# Patient Record
Sex: MALE | Race: WHITE | NOT HISPANIC OR LATINO | Employment: OTHER | URBAN - METROPOLITAN AREA
[De-identification: names, ages, dates, MRNs, and addresses within clinical notes are randomized per-mention and may not be internally consistent; named-entity substitution may affect disease eponyms.]

---

## 2017-10-14 ENCOUNTER — HOSPITAL ENCOUNTER (EMERGENCY)
Facility: HOSPITAL | Age: 82
Discharge: HOME | End: 2017-10-14
Attending: EMERGENCY MEDICINE | Admitting: EMERGENCY MEDICINE
Payer: MEDICARE

## 2017-10-14 VITALS
RESPIRATION RATE: 18 BRPM | HEART RATE: 78 BPM | BODY MASS INDEX: 24.44 KG/M2 | HEIGHT: 69 IN | WEIGHT: 165 LBS | DIASTOLIC BLOOD PRESSURE: 73 MMHG | SYSTOLIC BLOOD PRESSURE: 130 MMHG | TEMPERATURE: 98.3 F | OXYGEN SATURATION: 98 %

## 2017-10-14 DIAGNOSIS — F03.90 DEMENTIA (HCC): ICD-10-CM

## 2017-10-14 DIAGNOSIS — R46.89 COMBATIVE BEHAVIOR: Primary | ICD-10-CM

## 2017-10-14 DIAGNOSIS — Z86.59 HISTORY OF PSYCHIATRIC DISORDER: ICD-10-CM

## 2017-10-14 PROCEDURE — 99285 EMERGENCY DEPT VISIT HI MDM: CPT

## 2017-10-14 RX ORDER — DOXYCYCLINE HYCLATE 50 MG/1
324 CAPSULE, GELATIN COATED ORAL
COMMUNITY
End: 2019-01-02

## 2017-10-14 RX ORDER — TAMSULOSIN HYDROCHLORIDE 0.4 MG/1
0.4 CAPSULE ORAL
COMMUNITY

## 2017-10-14 RX ORDER — ACETAMINOPHEN 325 MG/1
650 TABLET ORAL EVERY 6 HOURS PRN
COMMUNITY

## 2017-10-14 RX ORDER — FINASTERIDE 5 MG/1
5 TABLET, FILM COATED ORAL DAILY
COMMUNITY

## 2017-10-14 NOTE — ED PROVIDER NOTES
History  Chief Complaint   Patient presents with    Aggressive Behavior     Per [facility] care patient was verbally aggessive with staff  Patient given ativan 0 25 mg PTA  Patient offers no verbal c/c at this time  79 y/o M presents with aggression  Hx of BPD, dementia, Depression  He made verbal threats to female nurses, has been going on daily  She states on the phone: " we know he will be sent back - but we need him away for awhile "  He has been violent to the nurses  Patient thinks he lives with his parents  Nurses gave him ativan which normally calms him down - but it was not today  This is his baseline, always gets worse at 3PM   He takes for psych the following meds: ativan, celexa, seroquel, neurontin  On ROS he states I am fine  No falls or accidents we are aware of  Normal exam   Normal vitals              Prior to Admission Medications   Prescriptions Last Dose Informant Patient Reported? Taking? Linagliptin (TRADJENTA) 5 MG TABS   Yes Yes   Sig: Take 5 mg by mouth daily   acetaminophen (TYLENOL) 325 mg tablet   Yes Yes   Sig: Take 650 mg by mouth every 6 (six) hours as needed for mild pain   ferrous gluconate (FERGON) 324 mg tablet   Yes Yes   Sig: Take 324 mg by mouth daily with breakfast   finasteride (PROSCAR) 5 mg tablet   Yes Yes   Sig: Take 5 mg by mouth daily   tamsulosin (FLOMAX) 0 4 mg   Yes Yes   Sig: Take 0 4 mg by mouth daily with dinner      Facility-Administered Medications: None       Past Medical History:   Diagnosis Date    Diabetes mellitus ([facility] [address] )     Hyperlipidemia     Psychiatric disorder        History reviewed  No pertinent surgical history  History reviewed  No pertinent family history  I have reviewed and agree with the history as documented      Social History   Substance Use Topics    Smoking status: Never Smoker    Smokeless tobacco: Never Used    Alcohol use No        Review of Systems   Unable to perform ROS: Dementia ([place] but denies all complaints) Constitutional: Negative for activity change, appetite change, chills and fever  HENT: Negative for congestion, rhinorrhea and sore throat  Eyes: Negative for photophobia and pain  Respiratory: Negative for cough, chest tightness and wheezing  Cardiovascular: Negative for chest pain, palpitations and leg swelling  Gastrointestinal: Negative for abdominal distention, abdominal pain, constipation, diarrhea and nausea  Genitourinary: Negative for dysuria, flank pain, frequency and hematuria  Musculoskeletal: Negative for arthralgias, back pain, myalgias, neck pain and neck stiffness  Skin: Negative for color change and rash  Neurological: Negative for seizures, speech difficulty, weakness, light-headedness and headaches  Hematological: Negative for adenopathy  Psychiatric/Behavioral: Negative for agitation, confusion, hallucinations and suicidal ideas  Physical Exam  ED Triage Vitals [10/14/17 1655]   Temperature Pulse Respirations Blood Pressure SpO2   98 3 °F (36 8 °C) 78 18 130/73 98 %      Temp Source Heart Rate Source Patient Position - Orthostatic VS BP Location FiO2 (%)   Oral Monitor Sitting Right arm --      Pain Score       No Pain           Physical Exam   Constitutional: He is oriented to person, place, and time  He appears well-developed and well-nourished  No distress  HENT:   Head: Normocephalic and atraumatic  Right Ear: External ear normal    Left Ear: External ear normal    Mouth/Throat: Oropharynx is clear and moist  No oropharyngeal exudate  Eyes: Conjunctivae and EOM are normal  Pupils are equal, round, and reactive to light  Right eye exhibits no discharge  Left eye exhibits no discharge  Neck: Normal range of motion  Neck supple  No JVD present  No tracheal deviation present  Cardiovascular: Normal rate and normal heart sounds  No murmur heard  Pulmonary/Chest: Effort normal and breath sounds normal  No respiratory distress  He has no wheezes   He has no rales  Abdominal: Soft  Bowel sounds are normal  He exhibits no distension  There is no tenderness  There is no rebound and no guarding  Nontender x4  When I push deeply he states do not be so rough with me I am not a oxen   Musculoskeletal: Normal range of motion  He exhibits no edema, tenderness or deformity  Moves extremities x4   Lymphadenopathy:     He has no cervical adenopathy  Neurological: He is alert and oriented to person, place, and time  No cranial nerve deficit  He exhibits normal muscle tone  Skin: Skin is warm and dry  Capillary refill takes less than 2 seconds  He is not diaphoretic  No erythema  No pallor  Psychiatric: He has a normal mood and affect  His behavior is normal  Judgment and thought content normal        ED Medications  Medications - No data to display    Diagnostic Studies  Labs Reviewed - No data to display    No orders to display       Procedures  Procedures      Phone Consults  ED Phone Contact    ED Course  ED Course            Identification of Seniors at [address] [place] [address] Most Recent Value   (ISAR) Identification of Seniors at Risk   Before the illness or injury that brought you to the Emergency, did you need someone to help you on a regular basis? 0 Filed at: 10/14/2017 1656   In the last 24 hours, have you needed more help than usual?  0 Filed at: 10/14/2017 1656   Have you been hospitalized for one or more nights during the past 6 months? 0 Filed at: 10/14/2017 1656   In general, do you see well?  0 Filed at: 10/14/2017 1656   In general, do you have serious problems with your memory? 1 Filed at: 10/14/2017 1656   Do you take more than three different medications every day?   1 Filed at: 10/14/2017 1656   ISAR Score  2 Filed at: 10/14/2017 1656                          MDM  Number of Diagnoses or Management Options  Combative behavior:   Dementia:   History of psychiatric disorder:   Diagnosis management comments:   Patient gave me permission to review past medical records  He has history of dementia with behavior disturbance  This appears per chart review and per nursing home report that his altered behavior is baseline for him  Only issue is increased at difficulty calming him down today  He has normal vital signs  He has normal exam   He has no signs of trauma  He has no complaints  In the department he is calm appropriate and pleasant  He obviously has severe dementia based on what he tells me during history and physical exam   However is stable  There is no indication for lab workup at this time is patient appears to be at his baseline  Discharge back to facility  CritCare Time    Disposition  Final diagnoses:   Combative behavior   Dementia   History of psychiatric disorder     ED Disposition     ED Disposition Condition Comment    Discharge  [place] [facility] discharge to home/self care  Condition at discharge: Good        Follow-up Information     Follow up With Specialties Details Why Contact Info Additional Information    Katherine Martinez MD  Schedule an appointment as soon as possible for a visit in 1 day As needed, If symptoms worsen [address], [place], [address] [place] [address]  49  [phone]       [facility] Emergency Department Emergency Medicine Go in 1 day As needed, If symptoms worsen [address] [place] [address] [address] [facility] ED, [place], [place], [address]        There are no discharge medications for this patient  No discharge procedures on file  ED Provider  Attending physically available and evaluated Steve Veronica I managed the patient along with the ED Attending      Electronically Signed by       Pb Ashford DO  Resident  10/15/17 4939

## 2017-10-14 NOTE — ED ATTENDING ATTESTATION
Tate Sifuentes DO, saw and evaluated the patient  I have discussed the patient with the resident/non-physician practitioner and agree with the resident's/non-physician practitioner's findings, Plan of Care, and MDM as documented in the resident's/non-physician practitioner's note, except where noted  All available labs and Radiology studies were reviewed  At this point I agree with the current assessment done in the Emergency Department  I have conducted an independent evaluation of this patient a history and physical is as follows:    79 yo male presents for evaluation of reportedly being agitated  According to SNF, pt is here because he was combative and "when he gets like this it is hard to calm him down" and that "we know you will probably send him back but try to keep him as long as possible"  No other hx reported  Pt affirms that he is here because he "was bootin' the girls"  He denies SI/HI, AH/VH  He offers no c/o at this time  Imp: agitation  Currently calm and cooperative plan: pt does not have any evidence of an emergent medical condition, will d/c          Critical Care Time  CritCare Time

## 2017-10-14 NOTE — ED NOTES
[facility] unable to transport patient at this time   [facility] will  patient around 2100- 2130     Lucero Vitale, TYLER  10/14/17 Soha Baumann RN  10/14/17 0494

## 2017-10-14 NOTE — ED NOTES
[facility] called for transport, someone will return call        Jeffrey Sanchez RN  10/14/17 8229

## 2017-10-14 NOTE — DISCHARGE INSTRUCTIONS
Dementia, Ambulatory Care   GENERAL INFORMATION:   Dementia  is a condition that causes loss of memory, thought control, and judgment  Dementia may develop quickly over a few months after a head injury or stroke  It may develop slowly over many years if you have Alzheimer disease  Dementia cannot be cured or prevented, but treatment may slow or reduce your symptoms  Common symptoms include the following:   · Loss of short-term memory, followed by loss of long-term memory    · Trouble remembering to go to the bathroom, to urinate, or have a bowel movement    · Anger or violent behavior     · Depression, anxiety, or hallucinations  Seek immediate care for the following symptoms:   · Signs of delirium, such as extreme confusion, and seeing or hearing things that are not there    · Anger or violence that cannot be calmed down    · Fainting and you cannot be woken  Treatment for dementia  may include medicines to slow memory loss  You may also need medicines to help control anger, decrease anxiety, or improve your mood  Take your medicines as directed  Manage dementia:   · Keep your mind and body active  Do activities that you love, such as art, gardening, or listening to music  Call or visit people often  This will keep your social skills sharp, and may help reduce depression  · Write daily schedules and routines  Record medical appointments, times to take your medicines, meal times, or any other things to remember  Write down reminders to use the bathroom if you have trouble remembering  You may need to ask someone to write things down for you  · Place clocks and calendars where you can see them  This will help you remember appointments and tasks  · Do not smoke  If you smoke, it is never too late to quit  Smoking may slow blood flow in your brain, and make your symptoms worse  Ask your caregiver for information if you need help quitting  · Eat healthy foods    Examples are fruits, vegetables, whole-grain breads, low-fat dairy products, beans, lean meats, and fish  Ask if you need to be on a special diet  · Ask your healthcare provider for a list of organizations that can help  You may begin to need an in-home aide to help you remember your daily tasks  Arrange for help while you are thinking clearly  Follow up with your healthcare provider as directed:  Ask someone to go with you to help you remember what your healthcare provider tells you  The person can take notes for you during the visit and go over the notes with you later  Write down your questions so you remember to ask them during your visits  CARE AGREEMENT:   You have the right to help plan your care  Learn about your health condition and how it may be treated  Discuss treatment options with your caregivers to decide what care you want to receive  You always have the right to refuse treatment  The above information is an  only  It is not intended as medical advice for individual conditions or treatments  Talk to your doctor, nurse or pharmacist before following any medical regimen to see if it is safe and effective for you  © 2014 [address] is for End User's use only and may not be sold, redistributed or otherwise used for commercial purposes  All illustrations and images included in CareNotes® are the copyrighted property of A D A M , [employer]  or Etienne Wellington

## 2018-11-13 ENCOUNTER — HOSPITAL ENCOUNTER (INPATIENT)
Facility: HOSPITAL | Age: 83
LOS: 1 days | Discharge: NURSING, REHAB OR LONG-TERM CARE | DRG: 552 | End: 2018-11-15
Attending: SURGERY | Admitting: SURGERY
Payer: MEDICARE

## 2018-11-13 ENCOUNTER — APPOINTMENT (EMERGENCY)
Dept: RADIOLOGY | Facility: HOSPITAL | Age: 83
DRG: 552 | End: 2018-11-13
Payer: MEDICARE

## 2018-11-13 DIAGNOSIS — S12.100A CLOSED DISPLACED FRACTURE OF SECOND CERVICAL VERTEBRA, UNSPECIFIED FRACTURE MORPHOLOGY, INITIAL ENCOUNTER (HCC): ICD-10-CM

## 2018-11-13 DIAGNOSIS — S02.32XA CLOSED FRACTURE OF LEFT ORBITAL FLOOR, INITIAL ENCOUNTER (HCC): ICD-10-CM

## 2018-11-13 DIAGNOSIS — S12.101A CLOSED NONDISPLACED FRACTURE OF SECOND CERVICAL VERTEBRA, UNSPECIFIED FRACTURE MORPHOLOGY, INITIAL ENCOUNTER (HCC): Primary | ICD-10-CM
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
## 2018-11-13 DIAGNOSIS — S02.2XXA CLOSED FRACTURE OF NASAL BONE, INITIAL ENCOUNTER: ICD-10-CM

## 2018-11-13 LAB
ANION GAP SERPL CALCULATED.3IONS-SCNC: 6 MMOL/L (ref 4–13)
APTT PPP: 34 SECONDS (ref 26–38)
BASE EXCESS BLDA CALC-SCNC: 3 MMOL/L (ref -2–3)
BASOPHILS # BLD AUTO: 0.06 THOUSANDS/ΜL (ref 0–0.1)
BASOPHILS NFR BLD AUTO: 1 % (ref 0–1)
BUN SERPL-MCNC: 18 MG/DL (ref 5–25)
CA-I BLD-SCNC: 1.15 MMOL/L (ref 1.12–1.32)
CALCIUM SERPL-MCNC: 8.5 MG/DL (ref 8.3–10.1)
CHLORIDE SERPL-SCNC: 104 MMOL/L (ref 100–108)
CO2 SERPL-SCNC: 25 MMOL/L (ref 21–32)
CREAT SERPL-MCNC: 1.03 MG/DL (ref 0.6–1.3)
EOSINOPHIL # BLD AUTO: 0.23 THOUSAND/ΜL (ref 0–0.61)
EOSINOPHIL NFR BLD AUTO: 4 % (ref 0–6)
ERYTHROCYTE [DISTWIDTH] IN BLOOD BY AUTOMATED COUNT: 13.6 % (ref 11.6–15.1)
GFR SERPL CREATININE-BSD FRML MDRD: 65 ML/MIN/1.73SQ M
GLUCOSE SERPL-MCNC: 162 MG/DL (ref 65–140)
GLUCOSE SERPL-MCNC: 166 MG/DL (ref 65–140)
HCO3 BLDA-SCNC: 27.1 MMOL/L (ref 24–30)
HCT VFR BLD AUTO: 37.7 % (ref 36.5–49.3)
HCT VFR BLD CALC: 35 % (ref 36.5–49.3)
HGB BLD-MCNC: 12.1 G/DL (ref 12–17)
HGB BLDA-MCNC: 11.9 G/DL (ref 12–17)
IMM GRANULOCYTES # BLD AUTO: 0.01 THOUSAND/UL (ref 0–0.2)
IMM GRANULOCYTES NFR BLD AUTO: 0 % (ref 0–2)
INR PPP: 1.02 (ref 0.86–1.17)
LYMPHOCYTES # BLD AUTO: 1.83 THOUSANDS/ΜL (ref 0.6–4.47)
LYMPHOCYTES NFR BLD AUTO: 35 % (ref 14–44)
MCH RBC QN AUTO: 28.4 PG (ref 26.8–34.3)
MCHC RBC AUTO-ENTMCNC: 32.1 G/DL (ref 31.4–37.4)
MCV RBC AUTO: 89 FL (ref 82–98)
MONOCYTES # BLD AUTO: 0.44 THOUSAND/ΜL (ref 0.17–1.22)
MONOCYTES NFR BLD AUTO: 8 % (ref 4–12)
NEUTROPHILS # BLD AUTO: 2.66 THOUSANDS/ΜL (ref 1.85–7.62)
NEUTS SEG NFR BLD AUTO: 52 % (ref 43–75)
NRBC BLD AUTO-RTO: 0 /100 WBCS
PCO2 BLD: 28 MMOL/L (ref 21–32)
PCO2 BLD: 41.2 MM HG (ref 42–50)
PH BLD: 7.43 [PH] (ref 7.3–7.4)
PLATELET # BLD AUTO: 178 THOUSANDS/UL (ref 149–390)
PMV BLD AUTO: 9.8 FL (ref 8.9–12.7)
PO2 BLD: 54 MM HG (ref 35–45)
POTASSIUM BLD-SCNC: 4.1 MMOL/L (ref 3.5–5.3)
POTASSIUM SERPL-SCNC: 4 MMOL/L (ref 3.5–5.3)
PROTHROMBIN TIME: 13.5 SECONDS (ref 11.8–14.2)
RBC # BLD AUTO: 4.26 MILLION/UL (ref 3.88–5.62)
SAO2 % BLD FROM PO2: 89 % (ref 95–98)
SODIUM BLD-SCNC: 141 MMOL/L (ref 136–145)
SODIUM SERPL-SCNC: 135 MMOL/L (ref 136–145)
SPECIMEN SOURCE: ABNORMAL
TROPONIN I SERPL-MCNC: <0.02 NG/ML
WBC # BLD AUTO: 5.23 THOUSAND/UL (ref 4.31–10.16)

## 2018-11-13 PROCEDURE — 90471 IMMUNIZATION ADMIN: CPT

## 2018-11-13 PROCEDURE — 82947 ASSAY GLUCOSE BLOOD QUANT: CPT

## 2018-11-13 PROCEDURE — 72125 CT NECK SPINE W/O DYE: CPT

## 2018-11-13 PROCEDURE — 82803 BLOOD GASES ANY COMBINATION: CPT

## 2018-11-13 PROCEDURE — 85014 HEMATOCRIT: CPT

## 2018-11-13 PROCEDURE — 80048 BASIC METABOLIC PNL TOTAL CA: CPT | Performed by: SURGERY

## 2018-11-13 PROCEDURE — 84484 ASSAY OF TROPONIN QUANT: CPT | Performed by: SURGERY

## 2018-11-13 PROCEDURE — 70450 CT HEAD/BRAIN W/O DYE: CPT

## 2018-11-13 PROCEDURE — 85730 THROMBOPLASTIN TIME PARTIAL: CPT | Performed by: SURGERY

## 2018-11-13 PROCEDURE — 90715 TDAP VACCINE 7 YRS/> IM: CPT | Performed by: EMERGENCY MEDICINE

## 2018-11-13 PROCEDURE — 1124F ACP DISCUSS-NO DSCNMKR DOCD: CPT | Performed by: NEUROLOGICAL SURGERY

## 2018-11-13 PROCEDURE — 85610 PROTHROMBIN TIME: CPT | Performed by: SURGERY

## 2018-11-13 PROCEDURE — 99285 EMERGENCY DEPT VISIT HI MDM: CPT

## 2018-11-13 PROCEDURE — 99222 1ST HOSP IP/OBS MODERATE 55: CPT | Performed by: SURGERY

## 2018-11-13 PROCEDURE — 85025 COMPLETE CBC W/AUTO DIFF WBC: CPT | Performed by: SURGERY

## 2018-11-13 PROCEDURE — 84132 ASSAY OF SERUM POTASSIUM: CPT

## 2018-11-13 PROCEDURE — 84295 ASSAY OF SERUM SODIUM: CPT

## 2018-11-13 PROCEDURE — 93005 ELECTROCARDIOGRAM TRACING: CPT

## 2018-11-13 PROCEDURE — 36415 COLL VENOUS BLD VENIPUNCTURE: CPT | Performed by: EMERGENCY MEDICINE

## 2018-11-13 PROCEDURE — 82330 ASSAY OF CALCIUM: CPT

## 2018-11-13 RX ORDER — LIDOCAINE 40 MG/G
CREAM TOPICAL AS NEEDED
COMMUNITY

## 2018-11-13 RX ORDER — MEMANTINE HYDROCHLORIDE 10 MG/1
10 TABLET ORAL 2 TIMES DAILY
Status: ON HOLD | COMMUNITY
End: 2018-11-15

## 2018-11-13 RX ORDER — OMEPRAZOLE 20 MG/1
20 CAPSULE, DELAYED RELEASE ORAL DAILY
COMMUNITY

## 2018-11-13 RX ORDER — ESCITALOPRAM OXALATE 5 MG/1
5 TABLET ORAL DAILY
COMMUNITY

## 2018-11-13 RX ORDER — TAMSULOSIN HYDROCHLORIDE 0.4 MG/1
0.4 CAPSULE ORAL
COMMUNITY
End: 2019-01-02

## 2018-11-13 RX ORDER — LORAZEPAM 0.5 MG/1
0.5 TABLET ORAL
Status: ON HOLD | COMMUNITY
End: 2018-11-15

## 2018-11-13 RX ORDER — LORAZEPAM 0.5 MG/1
0.5 TABLET ORAL 2 TIMES DAILY
Status: ON HOLD | COMMUNITY
End: 2018-11-15

## 2018-11-13 RX ORDER — FINASTERIDE 5 MG/1
5 TABLET, FILM COATED ORAL DAILY
COMMUNITY
End: 2019-01-02

## 2018-11-13 RX ORDER — AMOXICILLIN 250 MG
1 CAPSULE ORAL DAILY
COMMUNITY

## 2018-11-13 RX ORDER — FERROUS SULFATE 325(65) MG
325 TABLET ORAL
COMMUNITY

## 2018-11-13 RX ORDER — QUETIAPINE FUMARATE 50 MG/1
50 TABLET, FILM COATED ORAL
Status: ON HOLD | COMMUNITY
End: 2018-11-15

## 2018-11-13 RX ORDER — ACETAMINOPHEN 325 MG/1
650 TABLET ORAL EVERY 6 HOURS PRN
COMMUNITY
End: 2019-01-02

## 2018-11-13 RX ORDER — DOCUSATE SODIUM 100 MG/1
100 CAPSULE, LIQUID FILLED ORAL 2 TIMES DAILY
COMMUNITY
End: 2019-02-26

## 2018-11-13 RX ORDER — GABAPENTIN 300 MG/1
100 CAPSULE ORAL 4 TIMES DAILY
COMMUNITY
End: 2019-01-02

## 2018-11-13 RX ADMIN — TETANUS TOXOID, REDUCED DIPHTHERIA TOXOID AND ACELLULAR PERTUSSIS VACCINE, ADSORBED 0.5 ML: 5; 2.5; 8; 8; 2.5 SUSPENSION INTRAMUSCULAR at 19:02

## 2018-11-14 ENCOUNTER — APPOINTMENT (OUTPATIENT)
Dept: RADIOLOGY | Facility: HOSPITAL | Age: 83
DRG: 552 | End: 2018-11-14
Payer: MEDICARE

## 2018-11-14 ENCOUNTER — APPOINTMENT (INPATIENT)
Dept: RADIOLOGY | Facility: HOSPITAL | Age: 83
DRG: 552 | End: 2018-11-14
Payer: MEDICARE

## 2018-11-14 PROBLEM — R53.81 PHYSICAL DECONDITIONING: Status: ACTIVE | Noted: 2018-11-14

## 2018-11-14 PROBLEM — Z79.899 POLYPHARMACY: Status: ACTIVE | Noted: 2018-11-14

## 2018-11-14 PROBLEM — R41.89 COGNITIVE IMPAIRMENT: Status: ACTIVE | Noted: 2018-11-14

## 2018-11-14 PROBLEM — Z86.79 HISTORY OF ATRIAL FIBRILLATION: Status: ACTIVE | Noted: 2018-11-14

## 2018-11-14 PROBLEM — R41.0 DELIRIUM: Status: ACTIVE | Noted: 2018-11-14

## 2018-11-14 PROBLEM — G89.11 ACUTE PAIN DUE TO TRAUMA: Status: ACTIVE | Noted: 2018-11-14

## 2018-11-14 PROBLEM — R26.2 AMBULATORY DYSFUNCTION: Status: ACTIVE | Noted: 2018-11-14

## 2018-11-14 LAB
ANION GAP SERPL CALCULATED.3IONS-SCNC: 5 MMOL/L (ref 4–13)
ATRIAL RATE: 73 BPM
ATRIAL RATE: 78 BPM
BUN SERPL-MCNC: 16 MG/DL (ref 5–25)
CALCIUM SERPL-MCNC: 8.4 MG/DL (ref 8.3–10.1)
CHLORIDE SERPL-SCNC: 105 MMOL/L (ref 100–108)
CO2 SERPL-SCNC: 27 MMOL/L (ref 21–32)
CREAT SERPL-MCNC: 0.95 MG/DL (ref 0.6–1.3)
ERYTHROCYTE [DISTWIDTH] IN BLOOD BY AUTOMATED COUNT: 13.3 % (ref 11.6–15.1)
GFR SERPL CREATININE-BSD FRML MDRD: 72 ML/MIN/1.73SQ M
GLUCOSE SERPL-MCNC: 207 MG/DL (ref 65–140)
HCT VFR BLD AUTO: 36.1 % (ref 36.5–49.3)
HGB BLD-MCNC: 11.7 G/DL (ref 12–17)
MCH RBC QN AUTO: 28.5 PG (ref 26.8–34.3)
MCHC RBC AUTO-ENTMCNC: 32.4 G/DL (ref 31.4–37.4)
MCV RBC AUTO: 88 FL (ref 82–98)
P AXIS: 0 DEGREES
P AXIS: 58 DEGREES
PLATELET # BLD AUTO: 151 THOUSANDS/UL (ref 149–390)
PMV BLD AUTO: 9.9 FL (ref 8.9–12.7)
POTASSIUM SERPL-SCNC: 4.4 MMOL/L (ref 3.5–5.3)
PR INTERVAL: 206 MS
QRS AXIS: 0 DEGREES
QRS AXIS: 0 DEGREES
QRSD INTERVAL: 104 MS
QRSD INTERVAL: 94 MS
QT INTERVAL: 366 MS
QT INTERVAL: 368 MS
QTC INTERVAL: 405 MS
QTC INTERVAL: 417 MS
RBC # BLD AUTO: 4.11 MILLION/UL (ref 3.88–5.62)
SODIUM SERPL-SCNC: 137 MMOL/L (ref 136–145)
T WAVE AXIS: 16 DEGREES
T WAVE AXIS: 89 DEGREES
TROPONIN I SERPL-MCNC: <0.02 NG/ML
VENTRICULAR RATE: 73 BPM
VENTRICULAR RATE: 78 BPM
WBC # BLD AUTO: 8.64 THOUSAND/UL (ref 4.31–10.16)

## 2018-11-14 PROCEDURE — 99202 OFFICE O/P NEW SF 15 MIN: CPT | Performed by: PHYSICIAN ASSISTANT

## 2018-11-14 PROCEDURE — 99232 SBSQ HOSP IP/OBS MODERATE 35: CPT | Performed by: SURGERY

## 2018-11-14 PROCEDURE — 72040 X-RAY EXAM NECK SPINE 2-3 VW: CPT

## 2018-11-14 PROCEDURE — 93010 ELECTROCARDIOGRAM REPORT: CPT | Performed by: INTERNAL MEDICINE

## 2018-11-14 PROCEDURE — 85027 COMPLETE CBC AUTOMATED: CPT | Performed by: SURGERY

## 2018-11-14 PROCEDURE — 70498 CT ANGIOGRAPHY NECK: CPT

## 2018-11-14 PROCEDURE — 99222 1ST HOSP IP/OBS MODERATE 55: CPT | Performed by: NEUROLOGICAL SURGERY

## 2018-11-14 PROCEDURE — 93005 ELECTROCARDIOGRAM TRACING: CPT

## 2018-11-14 PROCEDURE — 84484 ASSAY OF TROPONIN QUANT: CPT | Performed by: SURGERY

## 2018-11-14 PROCEDURE — 36415 COLL VENOUS BLD VENIPUNCTURE: CPT | Performed by: SURGERY

## 2018-11-14 PROCEDURE — 80048 BASIC METABOLIC PNL TOTAL CA: CPT | Performed by: SURGERY

## 2018-11-14 RX ORDER — ACETAMINOPHEN 325 MG/1
975 TABLET ORAL EVERY 8 HOURS SCHEDULED
Status: DISCONTINUED | OUTPATIENT
Start: 2018-11-14 | End: 2018-11-15 | Stop reason: HOSPADM

## 2018-11-14 RX ORDER — LORAZEPAM 2 MG/ML
0.5 INJECTION INTRAMUSCULAR EVERY 6 HOURS PRN
Status: DISCONTINUED | OUTPATIENT
Start: 2018-11-14 | End: 2018-11-14

## 2018-11-14 RX ORDER — AMOXICILLIN 250 MG
1 CAPSULE ORAL DAILY
Status: DISCONTINUED | OUTPATIENT
Start: 2018-11-14 | End: 2018-11-15 | Stop reason: HOSPADM

## 2018-11-14 RX ORDER — MEMANTINE HYDROCHLORIDE 5 MG/1
10 TABLET ORAL DAILY
Status: DISCONTINUED | OUTPATIENT
Start: 2018-11-14 | End: 2018-11-15 | Stop reason: HOSPADM

## 2018-11-14 RX ORDER — OXYCODONE HYDROCHLORIDE 5 MG/1
2.5 TABLET ORAL EVERY 4 HOURS PRN
Status: DISCONTINUED | OUTPATIENT
Start: 2018-11-14 | End: 2018-11-15 | Stop reason: HOSPADM

## 2018-11-14 RX ORDER — ESCITALOPRAM OXALATE 10 MG/1
5 TABLET ORAL DAILY
Status: DISCONTINUED | OUTPATIENT
Start: 2018-11-14 | End: 2018-11-15 | Stop reason: HOSPADM

## 2018-11-14 RX ORDER — FERROUS SULFATE 325(65) MG
325 TABLET ORAL
Status: DISCONTINUED | OUTPATIENT
Start: 2018-11-14 | End: 2018-11-15 | Stop reason: HOSPADM

## 2018-11-14 RX ORDER — MORPHINE SULFATE 4 MG/ML
4 INJECTION, SOLUTION INTRAMUSCULAR; INTRAVENOUS
Status: DISCONTINUED | OUTPATIENT
Start: 2018-11-14 | End: 2018-11-14

## 2018-11-14 RX ORDER — LIDOCAINE 50 MG/G
1 PATCH TOPICAL DAILY
Status: DISCONTINUED | OUTPATIENT
Start: 2018-11-14 | End: 2018-11-15 | Stop reason: HOSPADM

## 2018-11-14 RX ORDER — LORAZEPAM 0.5 MG/1
0.5 TABLET ORAL 2 TIMES DAILY
Status: DISCONTINUED | OUTPATIENT
Start: 2018-11-14 | End: 2018-11-14

## 2018-11-14 RX ORDER — GABAPENTIN 100 MG/1
100 CAPSULE ORAL 3 TIMES DAILY
Status: DISCONTINUED | OUTPATIENT
Start: 2018-11-14 | End: 2018-11-15 | Stop reason: HOSPADM

## 2018-11-14 RX ORDER — ACETAMINOPHEN 325 MG/1
650 TABLET ORAL EVERY 6 HOURS PRN
Status: DISCONTINUED | OUTPATIENT
Start: 2018-11-14 | End: 2018-11-14

## 2018-11-14 RX ORDER — FINASTERIDE 5 MG/1
5 TABLET, FILM COATED ORAL DAILY
Status: DISCONTINUED | OUTPATIENT
Start: 2018-11-14 | End: 2018-11-15 | Stop reason: HOSPADM

## 2018-11-14 RX ORDER — OXYCODONE HYDROCHLORIDE 5 MG/1
5 TABLET ORAL EVERY 4 HOURS PRN
Status: DISCONTINUED | OUTPATIENT
Start: 2018-11-14 | End: 2018-11-15 | Stop reason: HOSPADM

## 2018-11-14 RX ORDER — MEMANTINE HYDROCHLORIDE 10 MG/1
10 TABLET ORAL 2 TIMES DAILY
Status: DISCONTINUED | OUTPATIENT
Start: 2018-11-14 | End: 2018-11-14

## 2018-11-14 RX ORDER — QUETIAPINE FUMARATE 25 MG/1
25 TABLET, FILM COATED ORAL
Status: DISCONTINUED | OUTPATIENT
Start: 2018-11-14 | End: 2018-11-15 | Stop reason: HOSPADM

## 2018-11-14 RX ORDER — DEXTROSE, SODIUM CHLORIDE, AND POTASSIUM CHLORIDE 5; .45; .15 G/100ML; G/100ML; G/100ML
60 INJECTION INTRAVENOUS CONTINUOUS
Status: DISCONTINUED | OUTPATIENT
Start: 2018-11-14 | End: 2018-11-15

## 2018-11-14 RX ORDER — QUETIAPINE FUMARATE 25 MG/1
50 TABLET, FILM COATED ORAL
Status: DISCONTINUED | OUTPATIENT
Start: 2018-11-14 | End: 2018-11-14

## 2018-11-14 RX ORDER — MORPHINE SULFATE 10 MG/ML
8 INJECTION, SOLUTION INTRAMUSCULAR; INTRAVENOUS EVERY 4 HOURS PRN
Status: DISCONTINUED | OUTPATIENT
Start: 2018-11-14 | End: 2018-11-14

## 2018-11-14 RX ORDER — PANTOPRAZOLE SODIUM 40 MG/1
40 TABLET, DELAYED RELEASE ORAL
Status: DISCONTINUED | OUTPATIENT
Start: 2018-11-14 | End: 2018-11-15 | Stop reason: HOSPADM

## 2018-11-14 RX ORDER — TAMSULOSIN HYDROCHLORIDE 0.4 MG/1
0.4 CAPSULE ORAL
Status: DISCONTINUED | OUTPATIENT
Start: 2018-11-14 | End: 2018-11-15 | Stop reason: HOSPADM

## 2018-11-14 RX ORDER — HYDROMORPHONE HCL/PF 1 MG/ML
0.2 SYRINGE (ML) INJECTION EVERY 4 HOURS PRN
Status: DISCONTINUED | OUTPATIENT
Start: 2018-11-14 | End: 2018-11-15 | Stop reason: HOSPADM

## 2018-11-14 RX ORDER — HALOPERIDOL 5 MG/ML
5 INJECTION INTRAMUSCULAR ONCE
Status: DISCONTINUED | OUTPATIENT
Start: 2018-11-14 | End: 2018-11-14

## 2018-11-14 RX ORDER — DOCUSATE SODIUM 100 MG/1
100 CAPSULE, LIQUID FILLED ORAL 2 TIMES DAILY
Status: DISCONTINUED | OUTPATIENT
Start: 2018-11-14 | End: 2018-11-15 | Stop reason: HOSPADM

## 2018-11-14 RX ORDER — LORAZEPAM 2 MG/ML
1 INJECTION INTRAMUSCULAR ONCE
Status: COMPLETED | OUTPATIENT
Start: 2018-11-14 | End: 2018-11-14

## 2018-11-14 RX ADMIN — DEXTROSE, SODIUM CHLORIDE, AND POTASSIUM CHLORIDE 60 ML/HR: 5; .45; .15 INJECTION INTRAVENOUS at 19:00

## 2018-11-14 RX ADMIN — LORAZEPAM 0.5 MG: 2 INJECTION INTRAMUSCULAR; INTRAVENOUS at 07:34

## 2018-11-14 RX ADMIN — GABAPENTIN 100 MG: 100 CAPSULE ORAL at 21:23

## 2018-11-14 RX ADMIN — LORAZEPAM 1 MG: 2 INJECTION INTRAMUSCULAR; INTRAVENOUS at 01:16

## 2018-11-14 RX ADMIN — DEXTROSE, SODIUM CHLORIDE, AND POTASSIUM CHLORIDE 60 ML/HR: 5; .45; .15 INJECTION INTRAVENOUS at 03:22

## 2018-11-14 RX ADMIN — IOHEXOL 85 ML: 350 INJECTION, SOLUTION INTRAVENOUS at 16:47

## 2018-11-14 RX ADMIN — QUETIAPINE FUMARATE 25 MG: 25 TABLET ORAL at 21:23

## 2018-11-14 RX ADMIN — MORPHINE SULFATE 4 MG: 4 INJECTION INTRAVENOUS at 02:11

## 2018-11-14 RX ADMIN — ACETAMINOPHEN 975 MG: 325 TABLET, FILM COATED ORAL at 21:22

## 2018-11-14 RX ADMIN — ENOXAPARIN SODIUM 40 MG: 40 INJECTION SUBCUTANEOUS at 19:36

## 2018-11-14 NOTE — ED PROVIDER NOTES
Emergency Department Airway Evaluation and Management Form    History  Obtained from: EMS  Patient has no allergy information on record  No chief complaint on file  HPI  60-year-old man on Eliquis presents after fall from standing  Unclear why he fell  Presents with laceration above the left eye  Is confused, unclear what his baseline is  No past medical history on file  No past surgical history on file  No family history on file  Social History   Substance Use Topics    Smoking status: Not on file    Smokeless tobacco: Not on file    Alcohol use Not on file     I have reviewed and agree with the history as documented  Review of Systems    Physical Exam  /80   Pulse 67   Temp 98 5 °F (36 9 °C)   Resp 20   SpO2 96%     Physical Exam  Airway intact  Clear bilateral breath sounds  Heart irregular, normal rate, no murmurs rubs or gallops  Pulses intact  No active bleeding  GCS 14  No clear evident focal neuro deficit  ED Medications  Medications   tetanus-diphtheria-acellular pertussis (BOOSTRIX) IM injection 0 5 mL (0 5 mL Intramuscular Given 11/13/18 4502)       Intubation  Procedures    Notes  60-year-old man on anticoagulation presents after fall from standing  Airway intact, no respiratory distress, GCS 14  No indication for airway intervention in the trauma [place]      CritCare Time    Final Diagnosis  Final diagnoses:   None       ED Provider  Electronically Signed by     Chelsie Mahan MD  11/13/18 2662

## 2018-11-14 NOTE — SPEECH THERAPY NOTE
Speech Language/Pathology  Dysphagia consult received  Patient is NPO and RN requests to hold on swallow eval until after repeat CT  Will f/u and assess when able

## 2018-11-14 NOTE — UTILIZATION REVIEW
Initial Clinical Review    Admission: Date/Time/Statement: Observation 11/13 and changed to Inpatient on 11/14 @ 1618    Admitting Physician SAMMY MORALES    Level of Care Med Surg    Bed Type Trauma    Estimated length of stay More than 2 Midnights    Certification I certify that inpatient services are medically necessary for this patient for a duration of greater than two midnights  See H&P and MD Progress Notes for additional information about the patient's course of treatment  ED: Date/Time/Mode of Arrival:   ED Arrival Information     Expected Arrival Acuity Means of Arrival Escorted By Service Admission Type    - 11/13/2018 18:54 Immediate Ambulance [facility] Complaint    -          Chief Complaint:   Chief Complaint   Patient presents with    Fall     Pt fell and hit head  Pt denies LOC but takes thinners  History of Illness: 80 y o  male who presents with a fall from standing height at [facility]  He is not supposed to be on his feet without assistance, but stood up at the dinner table, became unsteady, and fell  ED Vital Signs:   ED Triage Vitals   Temperature Pulse Respirations Blood Pressure SpO2   11/13/18 1855 11/13/18 1855 11/13/18 1855 11/13/18 1855 11/13/18 1855   98 5 °F (36 9 °C) 73 20 170/91 97 %      Temp Source Heart Rate Source Patient Position - Orthostatic VS BP Location FiO2 (%)   11/14/18 0100 11/13/18 2207 11/14/18 0100 11/13/18 2207 --   Oral Monitor Lying Right arm       Pain Score       11/14/18 0100       3        Wt Readings from Last 1 Encounters:   11/14/18 79 4 kg (175 lb 0 7 oz)       Vital Signs (abnormal): B/P = 185/114    Abnormal Labs: Na = 135  Wbc = 11 7/ 36 1    Diagnostic Test Results: CT Head - Minimally depressed fracture of the left orbital floor with secondary hemorrhage in the maxillary sinus  No entrapment of the inferior rectus muscle  Nondisplaced nasal bone fractures      CT Spine - Suspect nondisplaced linear fracture through the right lateral mass of C2  No acute subluxation or impingement upon the spinal canal at any level  ED Treatment:   Medication Administration from 11/13/2018 1850 to 11/14/2018 0851       Date/Time Order Dose Route Action     11/13/2018 1902 tetanus-diphtheria-acellular pertussis (BOOSTRIX) IM injection 0 5 mL 0 5 mL Intramuscular Given     11/14/2018 0734 LORazepam (ATIVAN) 2 mg/mL injection 0 5 mg 0 5 mg Intravenous Given     11/14/2018 0322 dextrose 5 % and sodium chloride 0 45 % with KCl 20 mEq/L infusion 60 mL/hr Intravenous New Bag     11/14/2018 0116 LORazepam (ATIVAN) 2 mg/mL injection 1 mg 1 mg Intravenous Given     11/14/2018 0211 morphine (PF) 4 mg/mL injection 4 mg 4 mg Intravenous Given          Past Medical/Surgical History: Active Ambulatory Problems     Diagnosis Date Noted    No Active Ambulatory Problems     Resolved Ambulatory Problems     Diagnosis Date Noted    No Resolved Ambulatory Problems     Past Medical History:   Diagnosis Date    Anxiety     [facility]rial fibrillation ([facility] [address] [facility]     Bipolar 1 disorder ([facility] [address] [facility]     Diabetes mellitus ([address]     Fracture of nasal bone     Hypertension     MDD (major depressive disorder)     Obstructive and reflux uropathy     Prostatic hyperplasia     Seizures ([address] )     UTI (urinary tract infection)        Admitting Diagnosis: Unspecified multiple injuries, initial encounter [T07  XXXA]    Age/Sex: 80 y o  male    Assessment/Plan:   87y Male to ED with S/P Fall/ Left Orbital Fracture/ C2 Fracture  Neurosurgery consult  OMFS consult  Ehsan Ledezma  PT/OT eval and treat  Neurological checks         Admission Orders:  NPO  Neurosurgery cons  Gerontology cons  Oral and Maxillofacial Surgery cons   PT/OT eval and treat    Scheduled Meds:  apixaban 5 mg Oral BID   docusate sodium 100 mg Oral BID   escitalopram 5 mg Oral Daily   ferrous sulfate 325 mg Oral Daily With Breakfast   finasteride 5 mg Oral Daily   gabapentin 100 mg Oral TID   haloperidol lactate 5 mg Intramuscular Once   memantine 10 mg Oral BID   pantoprazole 40 mg Oral Early Morning   QUEtiapine 50 mg Oral HS   senna-docusate sodium 1 tablet Oral Daily   sitaGLIPtin 25 mg Oral Daily   tamsulosin 0 4 mg Oral Daily With Dinner     Continuous Infusions:  dextrose 5 % and sodium chloride 0 45 % with KCl 20 mEq/L 60 mL/hr Last Rate: 60 mL/hr (11/14/18 0322)     PRN Meds:   acetaminophen    LORazepam    morphine injection

## 2018-11-14 NOTE — H&P
H&P Exam - Trauma   Aniceto Zaman 80 y o  male MRN: [id number]  Unit/Bed#: ED 20 Encounter: [id number]    Assessment/Plan   Trauma Alert: Level B  Model of Arrival: Ambulance  Trauma Team: Attending Ervin Allan, Residents Chai and ASA Paredes  Consultants: None    Trauma Active Problems: Fall    Trauma Plan: CT head and neck pending    Chief Complaint: Fall    History of Present Illness   HPI:  Aniceto Zaman is a 80 y o  male who presents with a fall from standing height at [facility]  He is not supposed to be on his feet without assistance, but stood up at the dinner table, became unsteady, and fell  He struck his head on the ground with no LOC  He is unable to describe the event but had no complaints at this time other than head pain  Mechanism:Fall    Review of Systems   Unable to perform ROS: Mental status change       Historical Information   History is unobtainable from the patient due to AMS  Efforts to obtain history included the following sources: other medical personnel, obtained from other records    Past Medical History:   Diagnosis Date    Anxiety     [facility]     Bipolar 1 disorder [facility]     Diabetes mellitus [facility]     Fracture of nasal bone     Hypertension     MDD (major depressive disorder)     Obstructive and reflux uropathy     Prostatic hyperplasia     Seizures ([facility]     UTI (urinary tract infection)      History reviewed  No pertinent surgical history  Social History   History   Alcohol Use No     History   Drug Use No     History   Smoking Status    Never Smoker   Smokeless Tobacco    Never Used     Immunization History   Administered Date(s) Administered    Tdap 11/13/2018     Last Tetanus: n/a  Family History: Non-contributory  Unable to obtain/limited by AMS      Meds/Allergies   all current active meds have been reviewed, current meds:   No current facility-administered medications for this encounter       and PTA meds:   Prior to Admission Medications   Prescriptions Last Dose Informant Patient Reported? Taking?    Cholecalciferol 1000 units capsule   Yes Yes   Sig: Take 2,000 Units by mouth daily   LORazepam (ATIVAN) 0 5 mg tablet   Yes Yes   Sig: Take 0 5 mg by mouth 2 (two) times a day   LORazepam (ATIVAN) 0 5 mg tablet   Yes Yes   Sig: Take 0 5 mg by mouth daily at bedtime   Linagliptin (TRADJENTA) 5 MG TABS   Yes Yes   Sig: Take 5 mg by mouth daily   QUEtiapine (SEROquel) 50 mg tablet   Yes Yes   Sig: Take 50 mg by mouth daily at bedtime   acetaminophen (TYLENOL) 325 mg tablet   Yes Yes   Sig: Take 650 mg by mouth every 6 (six) hours as needed for mild pain   apixaban (ELIQUIS) 5 mg   Yes Yes   Sig: Take 5 mg by mouth 2 (two) times a day   docusate sodium (COLACE) 100 mg capsule   Yes Yes   Sig: Take 100 mg by mouth 2 (two) times a day   escitalopram (LEXAPRO) 5 mg tablet   Yes Yes   Sig: Take 5 mg by mouth daily   ferrous sulfate 325 (65 Fe) mg tablet   Yes Yes   Sig: Take 325 mg by mouth daily with breakfast   finasteride (PROSCAR) 5 mg tablet   Yes Yes   Sig: Take 5 mg by mouth daily   gabapentin (NEURONTIN) 300 mg capsule   Yes Yes   Sig: Take 100 mg by mouth 4 (four) times a day   lidocaine (LMX) 4 % cream   Yes Yes   Sig: Apply topically as needed for mild pain   memantine (NAMENDA) 10 mg tablet   Yes Yes   Sig: Take 10 mg by mouth 2 (two) times a day   omeprazole (PriLOSEC) 20 mg delayed release capsule   Yes Yes   Sig: Take 20 mg by mouth daily   senna-docusate sodium (SENOKOT S) 8 6-50 mg per tablet   Yes Yes   Sig: Take 1 tablet by mouth daily   tamsulosin (FLOMAX) 0 4 mg   Yes Yes   Sig: Take 0 4 mg by mouth daily with dinner      Facility-Administered Medications: None       No Known Allergies      PHYSICAL EXAM    PE limited by: n/a    Objective   Vitals:   First set: Temperature: 98 5 °F (36 9 °C) (11/13/18 1855)  Pulse: 73 (11/13/18 1855)  Respirations: 20 (11/13/18 1855)  Blood Pressure: 170/91 (11/13/18 1855)    Primary Survey: (A) Airway: intact  (B) Breathing: bilateral breath sounds  (C) Circulation: Pulses:   normal  (D) Disabliity:  GCS Total:  14  (E) Expose:  Completed    Secondary Survey: (Click on Physical Exam tab above)  Physical Exam   Constitutional: He is oriented to person, place, and time  He appears well-developed and well-nourished  No distress  HENT:   Head: Normocephalic  Head is with laceration  Right Ear: External ear normal    Left Ear: External ear normal    Eyes: Pupils are equal, round, and reactive to light  Conjunctivae and EOM are normal  Right eye exhibits no discharge  Left eye exhibits no discharge  No scleral icterus  Neck: No tracheal deviation present  Cardiovascular: Normal rate and intact distal pulses  Pulmonary/Chest: Effort normal  No stridor  No respiratory distress  He exhibits no tenderness  Abdominal: Soft  He exhibits no distension  There is no tenderness  There is no rebound and no guarding  Musculoskeletal: Normal range of motion  He exhibits no edema or deformity  Neurological: He is alert and oriented to person, place, and time  No cranial nerve deficit  Skin: Skin is warm and dry  No rash noted  He is not diaphoretic  No erythema  Vitals reviewed        Invasive Devices          No matching active lines, drains, or airways          Lab Results:   BMP/CMP:   Lab Results   Component Value Date    SODIUM 135 (L) 11/13/2018    K 4 0 11/13/2018     11/13/2018    CO2 28 11/13/2018    BUN 18 11/13/2018    CREATININE 1 03 11/13/2018    GLUCOSE 166 (H) 11/13/2018    CALCIUM 8 5 11/13/2018    EGFR 65 11/13/2018    and CBC:   Lab Results   Component Value Date    WBC 5 23 11/13/2018    HGB 11 9 (L) 11/13/2018    HCT 35 (L) 11/13/2018    MCV 89 11/13/2018     11/13/2018    MCH 28 4 11/13/2018    MCHC 32 1 11/13/2018    RDW 13 6 11/13/2018    MPV 9 8 11/13/2018    NRBC 0 11/13/2018     Imaging/EKG Studies: FAST: Negative, Chest X-Ray: No traumatic injuries  Other Studies: CT head, neck    Code Status: No Order  Advance Directive and Living Will:      Power of :    POLST:

## 2018-11-14 NOTE — ASSESSMENT & PLAN NOTE
-continue scheduled Tylenol and Lidoderm patches with oxycodone as needed for moderate to severe pain  -continue bowel regimen

## 2018-11-14 NOTE — CONSULTS
Patient Name: Curtis Cordova YOB: 1931    Medical Record No : [id number]     Admit/Registration Date: 11/13/2018  6:54 PM  Date of Consult: 11/14/18      Oral and Maxillofacial Surgery Consult Note    Assessment:  80 y o  male with non displaced B/L nasal bone fracture and non displaced Left orbital floor fracture    Plan/Recs:  No surgical intervention needed  Sinus precautions  Cold compresses to face today  Warm compresses to face starting tomorrow  Pain management  Regular diet when allowed by primary team  ophthalmology evaluation if visual changes  F/U with OMS as outpatient if visual symptoms or nose deformity after swelling resolves      -----------------------------------------    80 y o  male was BIBEMS s/p fall of unclear etiology  He sustained a left eyebrow laceration and had GCS14 on presentation  OMFS is consulted concerning the finding of facial bone fractures on CT  Past Medical History:   Diagnosis Date    Anxiety     Atrial fibrillation ([place] [address] )     [facility]     Diabetes mellitus [facility] )     Fracture of nasal bone     Hypertension     MDD (major depressive disorder)     Obstructive and reflux uropathy     Prostatic hyperplasia     Seizures ([facility] )     UTI (urinary tract infection)      History reviewed  No pertinent surgical history  No Known Allergies    Social History     Social History    Marital status:      Spouse name: N/A    Number of children: N/A    Years of education: N/A     Occupational History    Not on file       Social History Main Topics    Smoking status: Never Smoker    Smokeless tobacco: Never Used    Alcohol use No    Drug use: No    Sexual activity: Not on file     Other Topics Concern    Not on file     Social History Narrative    No narrative on file       Scheduled Medications    Current Facility-Administered Medications:  acetaminophen 650 mg Oral Q6H PRN Anthony Fitzpatrick MD    apixaban 5 mg Oral BID Yosi Render MD [place]    dextrose 5 % and sodium chloride 0 45 % with KCl 20 mEq/L 60 mL/hr Intravenous Continuous Richy Gallegos MD Last Rate: 60 mL/hr (11/14/18 0322)   docusate sodium 100 mg Oral BID Richy Gallegos MD    escitalopram 5 mg Oral Daily Richy Gallegos MD    ferrous sulfate 325 mg Oral Daily With Breakfast Richy Gallegos MD    finasteride 5 mg Oral Daily Richy Gallegos MD    gabapentin 100 mg Oral TID Richy Gallegos MD    haloperidol lactate 5 mg Intramuscular Once Richy Gallegos MD    LORazepam 0 5 mg Intravenous Q6H PRN Richy Gallegos MD    memantine 10 mg Oral BID Richy Gallegos MD    morphine injection 8 mg Intravenous Q4H PRN Richy Gallegos MD    morphine injection 4 mg Intravenous Q3H PRN Richy Gallegos MD    pantoprazole 40 mg Oral Early Morning Richy Gallegos MD    QUEtiapine 50 mg Oral HS Richy Gallegos MD    senna-docusate sodium 1 tablet Oral Daily Richy Gallegos MD    sitaGLIPtin 25 mg Oral Daily Richy Gallegos MD    tamsulosin 0 4 mg Oral Daily With Abiola Bailey MD        PRN Medications    acetaminophen    LORazepam    morphine injection    morphine injection    Medication Infusions    dextrose 5 % and sodium chloride 0 45 % with KCl 20 mEq/L 60 mL/hr Last Rate: 60 mL/hr (11/14/18 0322)       Vitals:    Temp:  [98 5 °F (36 9 °C)] 98 5 °F (36 9 °C)  HR:  [67-89] 89  Resp:  [16-20] 18  BP: (141-170)/(70-93) 167/93  Wt Readings from Last 1 Encounters:   11/14/18 79 4 kg (175 lb 0 7 oz)     Labs:    Results from last 7 days  Lab Units 11/14/18  0524 11/13/18  1901 11/13/18  1900   WBC Thousand/uL 8 64  --  5 23   HEMOGLOBIN g/dL 11 7*  --  12 1   I STAT HEMOGLOBIN g/dl  --  11 9*  --    HEMATOCRIT % 36 1*  --  37 7   HEMATOCRIT, ISTAT %  --  35*  --    PLATELETS Thousands/uL 151  --  178       Results from last 7 days  Lab Units 11/14/18  0524 11/13/18  1901 11/13/18  1859   POTASSIUM mmol/L 4 4  --  4 0   CHLORIDE mmol/L 105  --  104 CO2 mmol/L 27  --  25   CO2, I-STAT mmol/L  --  28  --    BUN mg/dL 16  --  18   CREATININE mg/dL 0 95  --  1 03   GLUCOSE, ISTAT mg/dl  --  166*  --    CALCIUM mg/dL 8 4  --  8 5       Results from last 7 days  Lab Units 11/13/18  1859   INR  1 02   PTT seconds 34         Imaging:   Head CT: no acute changes  Facial Bone CT: non displaced left orbit floor fracture w/o sign of rectus muscle entrapment  Non displaced B/L nasal bone fracture   Partial U/L edentulism      Will Esquivel, DMD

## 2018-11-14 NOTE — ASSESSMENT & PLAN NOTE
- OMFS Consultation appreciated  - non-operative management/regular diet and f/u prn   - Pain Management

## 2018-11-14 NOTE — ED NOTES
Called in Consult for Neuro SX    Tried to call consult for OMS- the voice mail never beeped to leave msg   I tried twice     Unable to call consult for Gerontology or [address], RN  11/14/18 4610

## 2018-11-14 NOTE — ASSESSMENT & PLAN NOTE
-p r n   Haldol dose given overnight, this is improved this morning that he continues to be confused  -continue frequent reorientation with plans of discharge back to [facility] which is his home environment once medically cleared, possibly later today

## 2018-11-14 NOTE — ASSESSMENT & PLAN NOTE
-please see above  -appreciate geriatrics evaluation  -patient has baseline dementia, Caro Regan is being weaned

## 2018-11-14 NOTE — ASSESSMENT & PLAN NOTE
- Neurosurgery Consultation pending  - Cervical Collar   - Pain Management   - Neuro checks Q 2 hours   - check CTA neck to r/o blunt vascular injury given high C-spine/R lateral mass injury

## 2018-11-14 NOTE — ASSESSMENT & PLAN NOTE
-PT OT evaluations pending  -discharge back to [facility] when medically cleared
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
## 2018-11-14 NOTE — ORTHOTIC NOTE
Orthotic Note            Date: 11/14/2018      Patient Name: Britney Hart        Time :13:15pm    Reason for Consult:  Patient Active Problem List   Diagnosis    Fall    Nasal fracture    [facility] floor [facility]    C2 cervical fracture ([facility]    Polypharmacy    Acute pain due to trauma    Delirium    Cognitive impairment    Ambulatory dysfunction    Physical deconditioning    History of atrial fibrillation   [id number] [place] [phone]  Per Neurosurgical    I measured, fit, and donned [employer] while patient was supine in bed  Pt tolerated well and instructions/adjustments reviewed at this time  I molded bilateral sides and adjusted anterior chin plate to level 2 for optimal fit  Instructions, vista replacement pads, my contact information, and [place] shower collar at bedside  RN/PCA aware  I will continue daily follow up with patient  Recommendations:  Please call Mobility Coordinator at ext  [phone] in regards to bracing instruction and/or adjustment  Saad Olivas Mobility Coordinator LCFo, LCOF, ASOP R  O T, O B T

## 2018-11-14 NOTE — PROGRESS NOTES
INTERIM PROGRESS NOTE    Patient was seen in the ED again while awaiting a bed upstairs  He had shifted his C-collar up his face and off of his neck  The patient remains GCS 14 with orientation only to self  The collar was repositioned and the patient was educated on the importance of leaving the collar in place due to his C2 fracture  He will likely need constant reorientation       Sherryle Reichmann, MD

## 2018-11-14 NOTE — CONSULTS
Consultation - Neurosurgery   Panchito Canales 80 y o  male MRN: [id number]  Unit/Bed#: [facility] 835-01 Encounter: [id number]  Consult completed on 11/14/2018 at 12:30pm    Consults    Assessment/Plan     Assessment:  1  Nondisplaced linear fracture through the right lateral mass of C2  2  Minimally depressed fracture of the left orbital floor  3  Nondisplaced nasal fracture  4  Status post fall  5  Ambulatory dysfunction  6  Cognitive impairment  7  Biliary  8  History of falls  9  History of atrial fibrillation    Plan:  · Exam: GCS 13 (E3, V4, M6), very disoriented, unsure of how he fell, exam difficult to carry out, moving all extremities without focal weakness, unable to assess drift, no [place] or clonus noted, able to name some objects  · Imaging reviewed personally and with attending  Results are as follows:  · X-ray spine cervical 11/14/2018:  Official read pending  · CT neck with and without contrast 11/14/2018:  Official read pending  · CT head without contrast 11/13/2018:  No acute intracranial abnormality  Small vessel ischemic changes  Minimally depressed fracture of the left orbital floor with secondary hemorrhage in the maxillary sinus  No entrapment of the anterior rectus muscle  Nondisplaced nasal bone fractures  · CT spine cervical without contrast 11/13/2018: Suspected nondisplaced linear fracture through the right lateral mass of C2  No acute subluxation or impingement upon the spinal canal at any level  Multilevel degenerative disc disease  · X-ray cervical spine ordered and pending official read  · [employer] collar ordered - to be worn at all times  · Medical management per primary team  · DVT ppx:  SCDs, Lovenox  · Mobilize as tolerated with assistance  · Neurosurgery will continue to follow pending official read of the x-ray cervical spine  At this time surgical intervention is not recommended  Please call with questions or concerns      History of Present Illness   HPI: Elmira Lopez Lilian Omalley is a 80y o  year old male with PMH including delirium, cognitive impairment, history of falls, atrial fibrillation, ambulatory dysfunction who presented to the ED status post fall  CT cervical spine was significant for nondisplaced linear fracture through the right lateral mass of C2  History unable to be obtained from patient due to altered mental status and dementia  Per record, patient fell from standing height at [facility]  He struck his head on the ground while trying to stand at the dinner table with no loss of consciousness  He denies pain at this time  Per record, patient takes Eliquis  Review of Systems   Unable to perform ROS: Dementia       Historical Information   Past Medical History:   Diagnosis Date    Anxiety     [facility]     [facility] disorder [facility]     Diabetes mellitus [facility]     Fracture of nasal bone     Hypertension     MDD (major depressive disorder)     Obstructive and reflux uropathy     Prostatic hyperplasia     Seizures [facility]     UTI (urinary tract infection)      History reviewed  No pertinent surgical history  History   Alcohol Use No     History   Drug Use No     History   Smoking Status    Never Smoker   Smokeless Tobacco    Never Used     History reviewed  No pertinent family history      Meds/Allergies   all current active meds have been reviewed, current meds:   Current Facility-Administered Medications   Medication Dose Route Frequency    acetaminophen (TYLENOL) tablet 975 mg  975 mg Oral Q8H [facility]    dextrose 5 % and sodium chloride 0 45 % with KCl 20 mEq/L infusion  60 mL/hr Intravenous Continuous    docusate sodium (COLACE) capsule 100 mg  100 mg Oral BID    enoxaparin (LOVENOX) subcutaneous injection 40 mg  40 mg Subcutaneous Q24H [facility]    escitalopram (LEXAPRO) tablet 5 mg  5 mg Oral Daily    ferrous sulfate tablet 325 mg  325 mg Oral Daily With Breakfast    finasteride (PROSCAR) tablet 5 mg  5 mg Oral Daily    gabapentin (NEURONTIN) capsule 100 mg  100 mg Oral TID    HYDROmorphone (DILAUDID) injection 0 2 mg  0 2 mg Intravenous Q4H PRN    lidocaine (LIDODERM) 5 % patch 1 patch  1 patch Topical Daily    memantine (NAMENDA) tablet 10 mg  10 mg Oral Daily    oxyCODONE (ROXICODONE) IR tablet 2 5 mg  2 5 mg Oral Q4H PRN    oxyCODONE (ROXICODONE) IR tablet 5 mg  5 mg Oral Q4H PRN    pantoprazole (PROTONIX) EC tablet 40 mg  40 mg Oral Early Morning    QUEtiapine (SEROquel) tablet 25 mg  25 mg Oral HS    senna-docusate sodium (SENOKOT S) 8 6-50 mg per tablet 1 tablet  1 tablet Oral Daily    sitaGLIPtin (JANUVIA) tablet 25 mg  25 mg Oral Daily    tamsulosin (FLOMAX) capsule 0 4 mg  0 4 mg Oral Daily With Dinner    and PTA meds:   Prior to Admission Medications   Prescriptions Last Dose Informant Patient Reported? Taking?    Cholecalciferol 1000 units capsule   Yes Yes   Sig: Take 2,000 Units by mouth daily   LORazepam (ATIVAN) 0 5 mg tablet   Yes Yes   Sig: Take 0 5 mg by mouth 2 (two) times a day   LORazepam (ATIVAN) 0 5 mg tablet   Yes Yes   Sig: Take 0 5 mg by mouth daily at bedtime   Linagliptin (TRADJENTA) 5 MG TABS   Yes Yes   Sig: Take 5 mg by mouth daily   QUEtiapine (SEROquel) 50 mg tablet   Yes Yes   Sig: Take 50 mg by mouth daily at bedtime   acetaminophen (TYLENOL) 325 mg tablet   Yes Yes   Sig: Take 650 mg by mouth every 6 (six) hours as needed for mild pain   apixaban (ELIQUIS) 5 mg   Yes Yes   Sig: Take 5 mg by mouth 2 (two) times a day   docusate sodium (COLACE) 100 mg capsule   Yes Yes   Sig: Take 100 mg by mouth 2 (two) times a day   escitalopram (LEXAPRO) 5 mg tablet   Yes Yes   Sig: Take 5 mg by mouth daily   ferrous sulfate 325 (65 Fe) mg tablet   Yes Yes   Sig: Take 325 mg by mouth daily with breakfast   finasteride (PROSCAR) 5 mg tablet   Yes Yes   Sig: Take 5 mg by mouth daily   gabapentin (NEURONTIN) 300 mg capsule   Yes Yes   Sig: Take 100 mg by mouth 4 (four) times a day   lidocaine (LMX) 4 % cream   Yes Yes   Sig: Apply topically as needed for mild pain   memantine (NAMENDA) 10 mg tablet   Yes Yes   Sig: Take 10 mg by mouth 2 (two) times a day   omeprazole (PriLOSEC) 20 mg delayed release capsule   Yes Yes   Sig: Take 20 mg by mouth daily   senna-docusate sodium (SENOKOT S) 8 6-50 mg per tablet   Yes Yes   Sig: Take 1 tablet by mouth daily   tamsulosin (FLOMAX) 0 4 mg   Yes Yes   Sig: Take 0 4 mg by mouth daily with dinner      Facility-Administered Medications: None     No Known Allergies    Objective   I/O     None          Physical Exam   Constitutional: He appears well-developed and well-nourished  He is uncooperative  He is easily aroused  Cervical collar in place  HENT:   Head: Normocephalic  Head is with abrasion  Eyes: Pupils are equal, round, and reactive to light  Conjunctivae are normal    Ecchymosis over left eye   Neck:   C-collar in place   Cardiovascular: Normal rate  Pulmonary/Chest: Effort normal  No respiratory distress  Musculoskeletal: Normal range of motion  Neurological: He is alert and easily aroused  He has normal strength  Finger-nose-finger test: Unable to assess  Reflex Scores:       Tricep reflexes are 1+ on the right side and 1+ on the left side  Bicep reflexes are 1+ on the right side and 1+ on the left side  Brachioradialis reflexes are 1+ on the right side and 1+ on the left side  Patellar reflexes are 1+ on the right side and 1+ on the left side  Achilles reflexes are 1+ on the right side and 1+ on the left side  Skin: Skin is warm and dry  Abrasion and ecchymosis noted  Psychiatric: Cognition and memory are impaired  Neurologic Exam     Mental Status   Oriented to person  Disoriented to place  Disoriented to year, month and date  Registration: recalls 1 of 3 objects  Follows commands: Does not follow commands well  Attention: decreased  Concentration: decreased     Speech: (Confused speech)  Level of consciousness: Easily arousable  Able to name object  Patient is uncooperative and due to dementia and altered mental status is difficult to assess mental status     Cranial Nerves     CN III, IV, VI   Pupils are equal, round, and reactive to light  CN III: no CN III palsy  CN VI: no CN VI palsy  Conjugate gaze: present    CN VII   Right facial weakness: none  Left facial weakness: none    CN IX, X   CN IX normal    CN X normal    Difficult to assess cranial nerves in full     Motor Exam   Muscle bulk: normal  Overall muscle tone: increased    Strength   Strength 5/5 throughout  Unable to assess pronator drift     Sensory Exam   Unable to assess light touch, proprioception, pinprick     Gait, Coordination, and Reflexes     Coordination   Finger-nose-finger test: Unable to assess  Tremor   Resting tremor: absent  Intention tremor: absent  Action tremor: absent    Reflexes   Right brachioradialis: 1+  Left brachioradialis: 1+  Right biceps: 1+  Left biceps: 1+  Right triceps: 1+  Left triceps: 1+  Right patellar: 1+  Left patellar: 1+  Right achilles: 1+  Left achilles: 1+  Right : 1+  Left : 1+  Right Funk: absent  Left Funk: absent  Right ankle clonus: absent  Left ankle clonus: absent      Vitals:Blood pressure (!) 176/88, pulse 81, temperature 98 9 °F (37 2 °C), temperature source Oral, resp  rate 18, height 5' 10" (1 778 m), weight 90 kg (198 lb 6 6 oz), SpO2 95 %  ,Body mass index is 28 47 kg/m²       Lab Results:     Results from last 7 days  Lab Units 11/14/18 0524 11/13/18 1901 11/13/18  1900   WBC Thousand/uL 8 64  --  5 23   HEMOGLOBIN g/dL 11 7*  --  12 1   I STAT HEMOGLOBIN g/dl  --  11 9*  --    HEMATOCRIT % 36 1*  --  37 7   HEMATOCRIT, ISTAT %  --  35*  --    PLATELETS Thousands/uL 151  --  178   NEUTROS PCT %  --   --  52   MONOS PCT %  --   --  8       Results from last 7 days  Lab Units 11/14/18 0524 11/13/18 1901 11/13/18  1859   POTASSIUM mmol/L 4 4  --  4 0   CHLORIDE mmol/L 105  -- 104   CO2 mmol/L 27  --  25   CO2, I-STAT mmol/L  --  28  --    BUN mg/dL 16  --  18   CREATININE mg/dL 0 95  --  1 03   CALCIUM mg/dL 8 4  --  8 5   GLUCOSE, ISTAT mg/dl  --  166*  --                Results from last 7 days  Lab Units 11/13/18  1859   INR  1 02   PTT seconds 34     No results found for: TROPONINT  ABG:No results found for: PHART, HVL9QGX, PO2ART, QWH7RUO, N8BUAGKG, BEART, SOURCE    Imaging Studies: I have personally reviewed pertinent reports  and I have personally reviewed pertinent films in PACS    Trauma - Ct Head Wo Contrast    Result Date: 11/13/2018  Impression: No acute intracranial abnormality  Small vessel ischemic changes  Minimally depressed fracture of the left orbital floor with secondary hemorrhage in the maxillary sinus  No entrapment of the inferior rectus muscle  Nondisplaced nasal bone fractures  I personally discussed this study with Dr Maricarmen Regan on 11/13/2018 at 7:24 PM  Workstation performed: [id number]     Trauma - Ct Spine Cervical Wo Contrast    Result Date: 11/13/2018  Impression: Suspect nondisplaced linear fracture through the right lateral mass of C2  No acute subluxation or impingement upon the spinal canal at any level  Multilevel degenerative disc disease  I personally discussed this study with Braden SULTANA on 11/13/2018 at 7:25 PM   Workstation performed: [id number]     Xr Chest 1 View    Result Date: 11/14/2018  Impression: No active cardiopulmonary disease on limited supine imaging  Upright imaging may be helpful if clinically appropriate  Workstation performed: [id number]     Xr Trauma Multiple    Result Date: 11/13/2018  Impression: No active cardiopulmonary disease on limited supine imaging  Upright imaging may be helpful if clinically appropriate  Workstation performed: [id number]     EKG, Pathology, and Other Studies: I have personally reviewed pertinent reports        VTE Prophylaxis: Sequential compression device (Venodyne)  and Enoxaparin (Lovenox)    Code Status: Level 1 - Full Code  Advance Directive and Living Will:      Power of :    POLST:      Counseling / Coordination of Care  I spent 30 minutes with the patient

## 2018-11-14 NOTE — CONSULTS
Consultation - Rene Chang 80 y o  male MRN: [id number]  Unit/Bed#: ED 20 Encounter: [id number]      Assessment/Plan  1  Fall  Fall precautions  On multiple medications that could lead to falls, see 2  Recommend vitamin D3 1000 international units daily patient's medication regimen  PT, OT    2  Polypharmacy  Will reduce seroquel to 25mg QHS  Continue with lexapro 5mg daily  Will reduce namenda to 10mg daily -- goal to taper  Will reduce ativan to 0 25mg BID    3  Acute pain due to trauma  Will schedule tylenol 975mg Q8  Will add lidoderm patch  Will discontinue prn morphine  Will add oxycodone 2 5mg prn Q4 for moderate pain  Oxycodone 5mg prn q4 for severe pain    4  Delirium  Patient received multiple doses of lorazepam  Will place patient on Tylenol 975 mg Q 8, as pain can lead to agitation and delirium  See 3  For further pain med recommendations  Continue with bowel regimen as ordered  Agree with one-to-one, recommend treating pain, and other reversible causes of delirium, prior to administering benzodiazepines or antipsychotics  Patient is already on scheduled Seroquel, so will defer p r n  Antipsychotic at this time  Redirect unwanted patient behaviors as first line tx  Reorient patient frequently  Avoid deliriogenic meds including tramadol, benzodiazepines, benadryl  Good sleep hygiene important, limit night time interruptions  Encourage patient to stay awake during the day  Ensure adequate hydration/nutrition  Mobilize often     5   Cognitive impairment  Need to clarify baseline, unable to get hold of daughter or anyone at [facility]  Patient is on Namenda at home, will reduce to 10 mg daily  No signs of macrocytic anemia, will defer B12, folate assessment  Will speak with Dr Shaw Rankin physician at [facility] to establish baseline  Attempted to call daughter and [facility], no answers at each  Continue supportive care, see 1     6  Ambulatory dysfunction  Per trauma admission, patient is not supposed to be on his feet without assistance, but he did stand up without assistance and fell  Will follow up on further assistive devices usage    7  Deconditioning  Secondary to injuries, hospital stay  Mobilize frequently to prevent further decline  PT, OT    8  C2 fracture, left orbital fracture  Neurosurgery, OMFS consulted  Suspect non-op management          History of Present Illness   Physician Requesting Consult: Abiola Lincoln MD  Reason for Consult / Principal Problem: isar  Hx and PE limited by: n/a  HPI: Lianna Parker is a 80y o  year old male who presents to [facility] [place] [facility]  Patient was found to have left orbital fracture, C2 fracture, history of urinary retention, patient was admitted under the trauma team, Neurosurgery and OMFS were consulted  In the ED patient was only oriented to self, and was fidgeting with his C-collar  Patient was seen by OMFS who recommended non op management  Attempted to call patient's daughter Norma Logan, number has been disconnected  Inpatient consult to Gerontology  Consult performed by: Rosezella Alpers ordered by: Fifi Valentine          Review of Systems   Unable to perform ROS: Mental status change       Historical Information   Past Medical History:   Diagnosis Date    Anxiety     [facility]     Bipolar 1 disorder [facility]     Diabetes mellitus ([facility]     Fracture of nasal bone     Hypertension     MDD (major depressive disorder)     Obstructive and reflux uropathy     Prostatic hyperplasia     Seizures ([facility]     UTI (urinary tract infection)      History reviewed  No pertinent surgical history    Social History   History   Alcohol Use No     History   Drug Use No     History   Smoking Status    Never Smoker   Smokeless Tobacco    Never Used         Family History: non-contributory    Meds/Allergies   Current meds:   Current Facility-Administered Medications   Medication Dose Route Frequency    acetaminophen (TYLENOL) tablet 650 mg  650 mg Oral Q6H PRN    apixaban (ELIQUIS) tablet 5 mg  5 mg Oral BID    dextrose 5 % and sodium chloride 0 45 % with KCl 20 mEq/L infusion  60 mL/hr Intravenous Continuous    docusate sodium (COLACE) capsule 100 mg  100 mg Oral BID    escitalopram (LEXAPRO) tablet 5 mg  5 mg Oral Daily    ferrous sulfate tablet 325 mg  325 mg Oral Daily With Breakfast    finasteride (PROSCAR) tablet 5 mg  5 mg Oral Daily    gabapentin (NEURONTIN) capsule 100 mg  100 mg Oral TID    haloperidol lactate (HALDOL) injection 5 mg  5 mg Intramuscular Once    LORazepam (ATIVAN) 2 mg/mL injection 0 5 mg  0 5 mg Intravenous Q6H PRN    memantine (NAMENDA) tablet 10 mg  10 mg Oral BID    morphine (PF) 10 mg/mL injection 8 mg  8 mg Intravenous Q4H PRN    morphine (PF) 4 mg/mL injection 4 mg  4 mg Intravenous Q3H PRN    pantoprazole (PROTONIX) EC tablet 40 mg  40 mg Oral Early Morning    QUEtiapine (SEROquel) tablet 50 mg  50 mg Oral HS    senna-docusate sodium (SENOKOT S) 8 6-50 mg per tablet 1 tablet  1 tablet Oral Daily    sitaGLIPtin (JANUVIA) tablet 25 mg  25 mg Oral Daily    tamsulosin (FLOMAX) capsule 0 4 mg  0 4 mg Oral Daily With Dinner      Current PTA meds:  (Not in a hospital admission)     No Known Allergies    Objective   Vitals: Blood pressure (!) 185/114, pulse 89, temperature 98 5 °F (36 9 °C), temperature source Oral, resp  rate 18, height 6' (1 829 m), weight 79 4 kg (175 lb 0 7 oz), SpO2 95 %  ,Body mass index is 23 74 kg/m²  Physical Exam   Constitutional: He appears well-developed and well-nourished  No distress  HENT:   Head: Normocephalic  Mouth/Throat: No oropharyngeal exudate  Ecchymosis to face   Eyes: Conjunctivae and EOM are normal  No scleral icterus  Neck: Neck supple  Cardiovascular: Normal rate  Pulmonary/Chest: Effort normal and breath sounds normal  He has no wheezes  He has no rales  Abdominal: Soft   Bowel sounds are normal  He exhibits no distension  Musculoskeletal: He exhibits no edema  Neurological: He is alert  Skin: Skin is warm and dry  Psychiatric: He has a normal mood and affect  His behavior is normal  Thought content normal  He is not agitated and not actively hallucinating  Cognition and memory are impaired  He expresses impulsivity and inappropriate judgment  He exhibits abnormal recent memory and abnormal remote memory  Patient sleeping, but awakens to voice  Alert and oriented to self only  Not able to tell me where he is  Unsure of baseline, attempted to call [facility], no answer  Attempted to call daughter, number disconnected  Patient is on Namenda at home, as well as other antipsychotic and psychiatric medications, suspect cognitive impairment at baseline  Nursing note and vitals reviewed  Lab Results:   Results from last 7 days  Lab Units 11/14/18  0524   WBC Thousand/uL 8 64   HEMOGLOBIN g/dL 11 7*   HEMATOCRIT % 36 1*   PLATELETS Thousands/uL 151        Results from last 7 days  Lab Units 11/14/18  0524 11/13/18  1901   POTASSIUM mmol/L 4 4  --    CHLORIDE mmol/L 105  --    CO2 mmol/L 27  --    CO2, I-STAT mmol/L  --  28   BUN mg/dL 16  --    CREATININE mg/dL 0 95  --    CALCIUM mg/dL 8 4  --    GLUCOSE, ISTAT mg/dl  --  166*       Imaging Studies: I have personally reviewed pertinent reports  EKG, Pathology, and Other Studies: I have personally reviewed pertinent reports      VTE Prophylaxis: Sequential compression device (Venodyne)     Code Status: Level 1 - Full Code

## 2018-11-14 NOTE — SOCIAL WORK
Pt is from [facility] [address]  Pt is a bedhold there and can return once medically stable  Medical team made aware

## 2018-11-14 NOTE — PROGRESS NOTES
Tertiary trauma survey Note - Zena Peters 6/6/1931, 80 y o  male MRN: [id number]    Unit/Bed#: ED 20 Encounter: [id number]    Primary Care Provider: Luis Fernando Frye DO   Date and time admitted to hospital: 11/13/2018  6:54 PM        Fall   Assessment & Plan    - PT/OT Consultation pending  - Geriatrics recommendations appreciated     C2 cervical fracture [facility])   Assessment & Plan    - Neurosurgery Consultation pending  - Cervical Collar   - Pain Management   - Neuro checks Q 2 hours   - check CTA neck to r/o blunt vascular injury given high C-spine/R lateral mass injury     Closed fracture of left orbital floor [facility])   Assessment & Plan    - OMFS Consultation appreciated  - non-operative management/regular diet and f/u prn   - Pain Management      Nasal fracture   Assessment & Plan    - OMFS Consultation appreciated  - sinus precautions  - Pain Management      Acute pain due to trauma   Assessment & Plan    -continue scheduled Tylenol and Lidoderm patches with oxycodone as needed for moderate to severe pain  -continue bowel regimen     Ambulatory dysfunction   Assessment & Plan    -PT OT evaluations pending  -[place] [facility] [place]     Cognitive impairment   Assessment & Plan    -please see above  -appreciate geriatrics evaluation  -patient has baseline dementia, Karri Bynum is being weaned     Delirium   Assessment & Plan    -p r n   Haldol dose given overnight, this is improved this morning that he continues to be confused  -continue frequent reorientation with plans of [place] [facility] [place] once medically cleared, possibly later today     Physical deconditioning   Assessment & Plan    -PT OT evaluations pending  -[place] [facility] [place]     Polypharmacy   Assessment & Plan    -geriatrics recommendations appreciated:  Weaning Ativan, Seroquel, Namenda       Prophylaxis:  SCDs, discontinue home Eliquis due to high fall risk and start Lovenox for chemical DVT prophylaxis  Disposition:  CTA and Neurosurgery evaluations pending  Patient is admitted on observation to Med/surg status  Plans to discharge back to [facility] [place] where he has a bed hold when medically cleared, possibly later today  Bedside nurse rounds completed with nurse teeth  Patient updated regarding plan of care for the day  Mechanism of Injury: Fall    Transfer from:  Not applicable  Outside Films Received: not applicable  Tertiary Exam Due on: 11/14/2018    Subjective:  Patient has no complaints  He is on a one-to-one continue observation due to delirium and impulsivity  He continues to be confused  He does not have any complaints this morning  Vitals: Blood pressure (!) 185/114, pulse 89, temperature 98 5 °F (36 9 °C), temperature source Oral, resp  rate 18, height 6' (1 829 m), weight 79 4 kg (175 lb 0 7 oz), SpO2 95 %  ,Body mass index is 23 74 kg/m²  CT / RADIOGRAPHS: ALL RESULTS MUST BE CONFIRMED BY FACULTY OR PRINTED REPORT    Trauma - Ct Head Wo Contrast    Result Date: 11/13/2018  Impression: No acute intracranial abnormality  Small vessel ischemic changes  Minimally depressed fracture of the left orbital floor with secondary hemorrhage in the maxillary sinus  No entrapment of the inferior rectus muscle  Nondisplaced nasal bone fractures  I personally discussed this study with Dr Dominga Mcmullen on 11/13/2018 at 7:24 PM  Workstation performed: [id number]     Trauma - Ct Spine Cervical Wo Contrast    Result Date: 11/13/2018  Impression: Suspect nondisplaced linear fracture through the right lateral mass of C2  No acute subluxation or impingement upon the spinal canal at any level  Multilevel degenerative disc disease  I personally discussed this study with Julito SULTANA on 11/13/2018 at 7:25 PM   Workstation performed: [id number]     Xr Chest 1 View    Result Date: 11/14/2018  Impression: No active cardiopulmonary disease on limited supine imaging  Upright imaging may be helpful if clinically appropriate  Workstation performed: [id number]     Xr Trauma Multiple    Result Date: 11/13/2018  Impression: No active cardiopulmonary disease on limited supine imaging  Upright imaging may be helpful if clinically appropriate   Workstation performed: [employer]       Consultants - List Service/ Faculty and Date: Geriatrics, Neurosurgery    Active medications:           Current Facility-Administered Medications:     acetaminophen (TYLENOL) tablet 975 mg, 975 mg, Oral, Q8H [facility]    dextrose 5 % and sodium chloride 0 45 % with KCl 20 mEq/L infusion, 60 mL/hr, Intravenous, Continuous, 60 mL/hr at 11/14/18 0322    docusate sodium (COLACE) capsule 100 mg, 100 mg, Oral, BID    enoxaparin (LOVENOX) subcutaneous injection 40 mg, 40 mg, Subcutaneous, Q24H KRUPA    escitalopram (LEXAPRO) tablet 5 mg, 5 mg, Oral, Daily    ferrous sulfate tablet 325 mg, 325 mg, Oral, Daily With Breakfast    finasteride (PROSCAR) tablet 5 mg, 5 mg, Oral, Daily    gabapentin (NEURONTIN) capsule 100 mg, 100 mg, Oral, TID    HYDROmorphone (DILAUDID) injection 0 2 mg, 0 2 mg, Intravenous, Q4H PRN    lidocaine (LIDODERM) 5 % patch 1 patch, 1 patch, Topical, Daily    memantine (NAMENDA) tablet 10 mg, 10 mg, Oral, Daily    oxyCODONE (ROXICODONE) IR tablet 2 5 mg, 2 5 mg, Oral, Q4H PRN    oxyCODONE (ROXICODONE) IR tablet 5 mg, 5 mg, Oral, Q4H PRN    pantoprazole (PROTONIX) EC tablet 40 mg, 40 mg, Oral, Early Morning    QUEtiapine (SEROquel) tablet 25 mg, 25 mg, Oral, HS    senna-docusate sodium (SENOKOT S) 8 6-50 mg per tablet 1 tablet, 1 tablet, Oral, Daily    sitaGLIPtin (JANUVIA) tablet 25 mg, 25 mg, Oral, Daily    tamsulosin (FLOMAX) capsule 0 4 mg, 0 4 mg, Oral, Daily With Dinner    Current Outpatient Prescriptions:     acetaminophen (TYLENOL) 325 mg tablet    apixaban (ELIQUIS) 5 mg    Cholecalciferol 1000 units capsule    docusate sodium (COLACE) 100 mg capsule    escitalopram (LEXAPRO) 5 mg tablet    ferrous sulfate 325 (65 Fe) mg tablet    finasteride (PROSCAR) 5 mg tablet    gabapentin (NEURONTIN) 300 mg capsule    lidocaine (LMX) 4 % cream    Linagliptin (TRADJENTA) 5 MG TABS    LORazepam (ATIVAN) 0 5 mg tablet    LORazepam (ATIVAN) 0 5 mg tablet    memantine (NAMENDA) 10 mg tablet    omeprazole (PriLOSEC) 20 mg delayed release capsule    QUEtiapine (SEROquel) 50 mg tablet    senna-docusate sodium (SENOKOT S) 8 6-50 mg per tablet    tamsulosin (FLOMAX) 0 4 mg    No intake or output data in the 24 hours ending 11/14/18 1124    Invasive Devices          No matching active lines, drains, or airways          CAGE-AID Questionnaire:    Was the patient able to participate in the CAGE-AID screening questions on admission? No:   1  Does the patient consume alcohol? a  NO-Patient does not consume alcohol     2  Does the patient use street drugs or abuse prescription drugs? a  NO-Patient does not use street drugs or abuse prescription drugs    Did the patient answer YES in one of the questions above? No        Is the patient 65 years or older: YES:    1  Before the illness or injury that brought you to the Emergency, did you need someone to help you on a regular basis? 1=Yes   2  Since the illness or injury that brought you to the Emergency, have you needed more help than usual to take care of yourself? 1=Yes   3  Have you been hospitalized for one or more nights during the past 6 months (excluding a stay in the Emergency Department)? 1=Yes   4  In general, do you see well? 1=No   5  In general, do you have serious problems with your memory? 0=Yes   6  Do you take more than three different medications everyday? 1=Yes   TOTAL   6     Did you order a geriatric consult if the score was 2 or greater?:  Already seen by geriatrics this morning    1  GCS:  GCS Total:  14, Eye Opening:   Spontaneous = 4, Motor Response: Obeys commands = 6 and Verbal Response:  Confused = 4  2  Head:   a  Inspect and palpate SCALP for:  swelling/ecchymosis:  Present: + Left periorbital ecchymosis, EOMI , b  Inspect and palpate FACE for:   lac/abrasion:  None, c  Examine EYES Record: eye movement:  Appropriate, d  Inspect MOUTH for:  None and e  Inspect EARS for:  CSF leak, hemotympanum:  None  3  Neck:   WNL; + Cervical collar in place  4  Chest:   WNL  5  Abdomen/Pelvis:   WNL  6  Back (log roll with spinal immobilization unless cleared radiographically): WNL  7  Extremities:   Lacs, abrasions, swelling, ecchymosis: none   Tenderness, pain with motor, instability: none  8  Peripheral Nerves: WNL    Do NOT use the following abbreviations: DTO, gr, Radha, MS, MSO4, MgSO4, Nitro, QD, QID, QOD, u, , ?, ?g or trailing zeros   Always use a zero before a decimal     Labs:   CBC:   Lab Results   Component Value Date    WBC 8 64 11/14/2018    HGB 11 7 (L) 11/14/2018    HCT 36 1 (L) 11/14/2018    MCV 88 11/14/2018     11/14/2018    MCH 28 5 11/14/2018    MCHC 32 4 11/14/2018    RDW 13 3 11/14/2018    MPV 9 9 11/14/2018    NRBC 0 11/13/2018     CMP:   Lab Results   Component Value Date     11/14/2018    CO2 27 11/14/2018    CO2 28 11/13/2018    BUN 16 11/14/2018    CREATININE 0 95 11/14/2018    GLUCOSE 166 (H) 11/13/2018    CALCIUM 8 4 11/14/2018    EGFR 72 11/14/2018

## 2018-11-14 NOTE — ASSESSMENT & PLAN NOTE
-discontinue home Eliquis  -in setting dementia, ambulatory dysfunction, new cervical spine injury requiring bracing would recommend having risks versus benefit discussion regarding resume shin of Eliquis in light of fall risk prior to resuming  No contraindication to resuming from a trauma standpoint  Would recommend against however in setting of high risk of falls

## 2018-11-15 VITALS
DIASTOLIC BLOOD PRESSURE: 78 MMHG | SYSTOLIC BLOOD PRESSURE: 167 MMHG | WEIGHT: 198.41 LBS | RESPIRATION RATE: 20 BRPM | HEIGHT: 70 IN | TEMPERATURE: 97.9 F | OXYGEN SATURATION: 95 % | HEART RATE: 77 BPM | BODY MASS INDEX: 28.41 KG/M2

## 2018-11-15 PROCEDURE — G8997 SWALLOW GOAL STATUS: HCPCS

## 2018-11-15 PROCEDURE — 99232 SBSQ HOSP IP/OBS MODERATE 35: CPT | Performed by: NEUROLOGICAL SURGERY

## 2018-11-15 PROCEDURE — G8996 SWALLOW CURRENT STATUS: HCPCS

## 2018-11-15 PROCEDURE — 99232 SBSQ HOSP IP/OBS MODERATE 35: CPT | Performed by: SURGERY

## 2018-11-15 PROCEDURE — 92610 EVALUATE SWALLOWING FUNCTION: CPT

## 2018-11-15 RX ORDER — LORAZEPAM 0.5 MG/1
0.25 TABLET ORAL 2 TIMES DAILY
Qty: 10 TABLET | Refills: 0
Start: 2018-11-15 | End: 2018-11-15 | Stop reason: HOSPADM

## 2018-11-15 RX ORDER — QUETIAPINE FUMARATE 50 MG/1
25 TABLET, FILM COATED ORAL
Refills: 0
Start: 2018-11-15

## 2018-11-15 RX ORDER — LORAZEPAM 0.5 MG/1
0.25 TABLET ORAL 2 TIMES DAILY
Qty: 10 TABLET | Refills: 0
Start: 2018-11-15 | End: 2018-11-15

## 2018-11-15 RX ORDER — LORAZEPAM 0.5 MG/1
0.25 TABLET ORAL 2 TIMES DAILY
Qty: 10 TABLET | Refills: 0
Start: 2018-11-15 | End: 2019-01-02

## 2018-11-15 RX ORDER — MEMANTINE HYDROCHLORIDE 10 MG/1
10 TABLET ORAL DAILY
Refills: 0
Start: 2018-11-15

## 2018-11-15 RX ADMIN — ACETAMINOPHEN 975 MG: 325 TABLET, FILM COATED ORAL at 06:11

## 2018-11-15 RX ADMIN — PANTOPRAZOLE SODIUM 40 MG: 40 TABLET, DELAYED RELEASE ORAL at 06:10

## 2018-11-15 RX ADMIN — FERROUS SULFATE TAB 325 MG (65 MG ELEMENTAL FE) 325 MG: 325 (65 FE) TAB at 07:39

## 2018-11-15 RX ADMIN — ESCITALOPRAM OXALATE 5 MG: 5 TABLET, FILM COATED ORAL at 09:32

## 2018-11-15 RX ADMIN — GABAPENTIN 100 MG: 100 CAPSULE ORAL at 09:32

## 2018-11-15 RX ADMIN — DOCUSATE SODIUM 100 MG: 100 CAPSULE, LIQUID FILLED ORAL at 09:34

## 2018-11-15 RX ADMIN — FINASTERIDE 5 MG: 5 TABLET, FILM COATED ORAL at 09:32

## 2018-11-15 RX ADMIN — MEMANTINE HYDROCHLORIDE 10 MG: 10 TABLET ORAL at 09:33

## 2018-11-15 RX ADMIN — SITAGLIPTIN 25 MG: 25 TABLET, FILM COATED ORAL at 09:32

## 2018-11-15 RX ADMIN — SENNOSIDES AND DOCUSATE SODIUM 1 TABLET: 8.6; 5 TABLET ORAL at 09:32

## 2018-11-15 NOTE — ASSESSMENT & PLAN NOTE
- Appreciate geriatric service evaluation and recommendations  - Patient has baseline dementia, Pilar Quintana is being weaned

## 2018-11-15 NOTE — ASSESSMENT & PLAN NOTE
-p r n   Haldol dose given overnight, this is improved this morning that he continues to be confused  -continue frequent reorientation with plans of discharge back [place] [facility] [place] once medically cleared later today

## 2018-11-15 NOTE — ASSESSMENT & PLAN NOTE
Appreciate geriatric service evaluation and recommendations with plan to wean Ativan, Seroquel, Namenda

## 2018-11-15 NOTE — ASSESSMENT & PLAN NOTE
- Fall precautions   - PT and OT evaluation and treatment as indicated  - Anticipate [place] [facility] [place]

## 2018-11-15 NOTE — QUICK NOTE
Nurse-Patient-Provider rounds were completed with the patient's nurse today, Keesha Justin  We discussed the plan is to adjust his diet to a level 3 dysphagia with thin liquids following speech therapy evaluation  His IV saline may be locked this morning  Plan for discharge back to [facility] today  We reviewed all of the invasive devices/lines/telemetry orders   - None  Pain Assessment / Plan:  - Continue analgesic regimen as needed  Mobility Assessment / Plan: Activity as tolerated with assistance only  Goals / Barriers for discharge:  - Anticipate discharge around 12:30 this afternoon   - Case management following; case and discharge needs discussed  No answer upon attempted calls to his listed contacts  All questions and concerns were addressed  I spent greater than 10 minutes reviewing the plan with the patient and the nurse, and coordinating care for the day      Layla Parekh PA-C  11/15/2018 9:16 AM

## 2018-11-15 NOTE — ASSESSMENT & PLAN NOTE
- Neurosurgery recommendations appreciated  - Aspen Cervical Collar   - Pain Management   - Neuro checks Q 2 hours   - CTA shows L inferior orbital blowout fracture

## 2018-11-15 NOTE — DISCHARGE INSTRUCTIONS
Discharge Instructions:    Please follow-up as instructed  If you need a follow-up appointment, please call the office when you leave to schedule an appointment  Activity:  - PT and OT evaluation and treatment as indicated  - You may resume activity as tolerated only with assistance  Diet:    - You may continue a level 3 dysphagia diet with thin liquids  Medications:  - You may resume all of your regular medications, except for Eliquis until risks and benefits of anticoagulation are discussed with the patient and his family due to the high risk of falls  Please refer to your discharge medication list for further details  - Please take the pain medications as directed  - You may become constipated, especially if taking pain medications  You may take any over the counter stool softeners or laxatives as needed  Examples: Milk of Magnesia, Colace, Senna  Additional Instructions:  - May shower daily   - If you have any questions or concerns after discharge please call the office   - Call office or return to ER if fever greater than 101, chills, persistent nausea/vomiting, worsening/uncontrollable pain, develop productive cough, increasing shortness of breath, difficulty breathing, and/or new or worsening numbness/tingling/weakness and review his extremities  OMS Discharge Instructions:  - Maintain sinus precautions  - May use warm compresses to face for 20 minutes at a time   - Recommend Ophthalmology evaluation if visual changes develop  - Recommend outpatient follow up with OMS if visual symptoms develop and/or there is a nose deformity after swelling resolves  Discharge Instructions - Neurosurgery    · VISTA collar at all times except for showering change to stephania collar  · No heavy lifting  No strenuous activities  NO DRIVING  **Please notify MD immediately if you have increased neck or arm pain  New numbness and/or weakness in your arm   Difficulty swallowing or breathing especially while lying down  Numbness or weakness in arms or legs  **    Please follow up in 2 weeks with the Neurosurgical group with repeat X-ray of cervical spine 2-3 days prior to your appointment  You may go to any [facility] facility to get your imaging done  Please call [phone] to schedule your imaging appointment

## 2018-11-15 NOTE — SOCIAL WORK
Pt will rerturn to [facility] 2029 @3434  Cm attempted to call the pt's two contacts but both numbers were out of service  CM asked [facility] if there was anyone CM should call to inform regarding the pt's return and [facility] 2029 said NO  CM informed [facility] and pt's nurse

## 2018-11-15 NOTE — ASSESSMENT & PLAN NOTE
- Status post fall with the below noted injuries  - Fall precautions  - Appreciate Geriatrics evaluation and recommendations   - PT and OT evaluation and treatment as indicated

## 2018-11-15 NOTE — ASSESSMENT & PLAN NOTE
- Discontinue home Eliquis  - In setting dementia, ambulatory dysfunction, new cervical spine injury requiring bracing would recommend having risks versus benefit discussion regarding Eliquis in light of fall risk prior to resuming  No contraindication to resuming from a traumatic injury standpoint, however, would recommend against restarting anticoagulation secondary to high risk of falls

## 2018-11-15 NOTE — PROGRESS NOTES
Progress Note - Neurosurgery   Prerna Lucas 80 y o  male MRN: [id number]  Unit/Bed#: [facility] 835-01 Encounter: [id number]    Assessment:  1  Nondisplaced linear fracture through the right lateral mass of C2  2  Minimally depressed fracture of the left orbital floor  3  Nondisplaced nasal fracture  4  Status post fall  5  Ambulatory dysfunction  6  Cognitive impairment  7  Biliary  8  History of falls  9  History of atrial fibrillation     Plan:  · Exam: GCS 13 (E3, V4, M6), very disoriented, strength 5/5, and moving all extremities without focal weakness, left drift, no Asuncion or clonus noted, able to name some objects, difficult to assess JPS and DST due to dementia  · Imaging reviewed personally and with attending  Results are as follows:  ? X-ray spine cervical 11/14/2018:   limited evaluation of the [place] atlantoaxial axillary region and cervicothoracic junction again to patient rotation and medical intervention  Previously questioned C2 fracture is not well radiographically  ? CT neck with and without contrast 11/14/2018:  Official read pending  ? CT head without contrast 11/13/2018:  No acute intracranial abnormality  Small vessel ischemic changes  Minimally depressed fracture of the left orbital floor with secondary hemorrhage in the maxillary sinus  No entrapment of the anterior rectus muscle  Nondisplaced nasal bone fractures  ? CT spine cervical without contrast 11/13/2018: Suspected nondisplaced linear fracture through the right lateral mass of C2  No acute subluxation or impingement upon the spinal canal at any level  Multilevel degenerative disc disease  · [employer] [employer] [place] collar to be worn at all times, stephania collar ordered for showering  · Medical management per primary team  · DVT ppx:  SCDs, Lovenox  · Mobilize as tolerated with assistance  · Neurosurgery will follow up p r n  Hospitalization  No surgical intervention is recommended at this time    Please call if questions or concerns  ? Patient is to follow up outpatient in about 2-4 weeks with repeat upright cervical spine x-rays  Subjective/Objective   Chief Complaint:  Follow-up C2 fracture    Subjective:  ROS is difficult to obtain due to patient's mental status  He reports mild neck pain  Denies headache, dizziness, abdominal pain, chest pain, shortness of breath, numbness, tingling, weakness  Objective:  Lying in bed, NAD    I/O       11/13 0701 - 11/14 0700 11/14 0701 - 11/15 0700 11/15 0701 - 11/16 0700    I V  (mL/kg)  1616 (18) 192 (2 1)    Total Intake(mL/kg)  1616 (18) 192 (2 1)    Urine (mL/kg/hr)  224 (0 1)     Stool  0     Total Output   224      Net   +1392 +192           Unmeasured Urine Occurrence  1 x           Invasive Devices     Peripheral Intravenous Line            Peripheral IV 11/15/18 Right Forearm less than 1 day                Physical Exam:  Vitals: Blood pressure 167/78, pulse 77, temperature 97 9 °F (36 6 °C), temperature source Oral, resp  rate 20, height 5' 10" (1 778 m), weight 90 kg (198 lb 6 6 oz), SpO2 95 %  ,Body mass index is 28 47 kg/m²      General appearance: alert, appears stated age, cooperative and no distress  Head: Normocephalic, without obvious abnormality, atraumatic  Eyes:  Does not track well, conjugate gaze, PERRL  Neck: supple, symmetrical, trachea midline and NT, C-collar on  Lungs: non labored breathing  Heart: regular heart rate  Neurologic:   Mental status: Alert and oriented to self only, able to add numbers but cannot add change, does not repeat sentence, can name pen but nothing else, follows commands, thought content appropriate  Cranial nerves: grossly intact (Cranial nerves II-XII)  Sensory: normal to light touch, Difficult to test JPS and DST  Motor: moving all extremities without focal weakness, strength 5/5  Reflexes: 1+ and symmetric, no Asuncion or clonus noted  Coordination:  Abnormal finger-to-nose bilaterally with inability to correct, left drift noted    Lab Results:    Results from last 7 days  Lab Units 11/14/18  0524 11/13/18  1901 11/13/18  1900   WBC Thousand/uL 8 64  --  5 23   HEMOGLOBIN g/dL 11 7*  --  12 1   I STAT HEMOGLOBIN g/dl  --  11 9*  --    HEMATOCRIT % 36 1*  --  37 7   HEMATOCRIT, ISTAT %  --  35*  --    PLATELETS Thousands/uL 151  --  178   NEUTROS PCT %  --   --  52   MONOS PCT %  --   --  8       Results from last 7 days  Lab Units 11/14/18  0524 11/13/18  1901 11/13/18  1859   POTASSIUM mmol/L 4 4  --  4 0   CHLORIDE mmol/L 105  --  104   CO2 mmol/L 27  --  25   CO2, I-STAT mmol/L  --  28  --    BUN mg/dL 16  --  18   CREATININE mg/dL 0 95  --  1 03   CALCIUM mg/dL 8 4  --  8 5   GLUCOSE, ISTAT mg/dl  --  166*  --                Results from last 7 days  Lab Units 11/13/18  1859   INR  1 02   PTT seconds 34     No results found for: TROPONINT  ABG:No results found for: PHART, YHX0IBA, PO2ART, PHB4ZJA, Y8HVCVCX, BEART, SOURCE    Imaging Studies: I have personally reviewed pertinent reports  and I have personally reviewed pertinent films in PACS    Xr Spine Cervical 2 Or 3 Vw Injury    Result Date: 11/14/2018  Impression: 1  Limited evaluation of the atlantoaxial axillary region and cervicothoracic junction due to patient rotation and difficulty in patient positioning  2  Previously questioned C2 fracture is not well seen radiographically  Workstation performed: [id number]     Trauma - Ct Head Wo Contrast    Result Date: 11/13/2018  Impression: No acute intracranial abnormality  Small vessel ischemic changes  Minimally depressed fracture of the left orbital floor with secondary hemorrhage in the maxillary sinus  No entrapment of the inferior rectus muscle  Nondisplaced nasal bone fractures  I personally discussed this study with Dr Alayna Banks on 11/13/2018 at 7:24 PM  Workstation performed: [id number]     Cta Neck W Wo Contrast    Result Date: 11/14/2018  Impression: No evidence of dissection, aneurysm or hemodynamically significant stenosis  Left inferior orbital floor fracture with near complete opacification of left maxillary sinus with blood  Workstation performed: [id number]     Trauma - Ct Spine Cervical Wo Contrast    Result Date: 11/13/2018  Impression: Suspect nondisplaced linear fracture through the right lateral mass of C2  No acute subluxation or impingement upon the spinal canal at any level  Multilevel degenerative disc disease  I personally discussed this study with Keri SULTANA on 11/13/2018 at 7:25 PM   Workstation performed: [id number]     Xr Chest 1 View    Result Date: 11/14/2018  Impression: No active cardiopulmonary disease on limited supine imaging  Upright imaging may be helpful if clinically appropriate  Workstation performed: [id number]     Xr Trauma Multiple    Result Date: 11/13/2018  Impression: No active cardiopulmonary disease on limited supine imaging  Upright imaging may be helpful if clinically appropriate  Workstation performed: [id number]     EKG, Pathology, and Other Studies: I have personally reviewed pertinent reports        VTE Pharmacologic Prophylaxis: Enoxaparin (Lovenox)    VTE Mechanical Prophylaxis: sequential compression device

## 2018-11-15 NOTE — ASSESSMENT & PLAN NOTE
- Appreciate Neurosurgery evaluation and recommendations   - Non operative management  - Maintain Cervical Collar at all times  - Continue analgesic regimen as needed  - Activity with assistance only  - PT and OT evaluation and treatment as indicated  - Stable for discharge on 11/15/2018

## 2018-11-15 NOTE — ASSESSMENT & PLAN NOTE
-please see above  -appreciate geriatrics evaluation  -patient has baseline dementia, Sarah Crespo is being weaned

## 2018-11-15 NOTE — PLAN OF CARE
Problem: SLP ADULT - SWALLOWING, IMPAIRED  Goal: Initial SLP swallow eval performed  Outcome: Completed Date Met: 11/15/18

## 2018-11-15 NOTE — DISCHARGE SUMMARY
Discharge- Jaciel Velasco 6/6/1931, 80 y o  male MRN: [id number]    Unit/Bed#: [facility] 835-01 Encounter: [id number]    Primary Care Provider: Alexandria Orr DO   Date and time admitted to hospital: 11/13/2018  6:54 PM        Fall   Assessment & Plan    - Status post fall with the below noted injuries  - Fall precautions  - Appreciate Geriatrics evaluation and recommendations   - PT and OT evaluation and treatment as indicated  * C2 cervical fracture [facility])   Assessment & Plan    - Appreciate Neurosurgery evaluation and recommendations   - Non operative management  - Maintain Cervical Collar at all times  - Continue analgesic regimen as needed  - Activity with assistance only  - PT and OT evaluation and treatment as indicated  - Stable for discharge on 11/15/2018  Closed fracture of left orbital floor [facility])   Assessment & Plan    - Appreciate OMS evaluation and recommendations   - Non operative management  - Maintain sinus precautions  - Continue analgesic regimen as needed  - Stable for discharge on 11/15/2018 with outpatient follow-up as needed with OMS if patient is experiencing visual symptoms and/or has a nasal deformity once swelling resolved  Nasal fracture   Assessment & Plan    - Appreciate OMS evaluation and recommendations   - Non operative management  - Maintain sinus precautions  - Continue analgesic regimen as needed  - Stable for discharge on 11/15/2018 with outpatient follow-up as needed with OMS if patient is experiencing visual symptoms and/or has a nasal deformity once swelling resolved  Acute pain due to trauma   Assessment & Plan    - Continue multimodal analgesic regimen as needed  - Continue bowel regimen  History of atrial fibrillation   Assessment & Plan    - Discontinue home Eliquis    - In setting dementia, ambulatory dysfunction, new cervical spine injury requiring bracing would recommend having risks versus benefit discussion regarding Eliquis in light of fall risk prior to resuming  No contraindication to resuming from a traumatic injury standpoint, however, would recommend against restarting anticoagulation secondary to high risk of falls  Delirium   Assessment & Plan    - P r n  Haldol dose given overnight, this is improved this morning  He continues to be confused, but this is suspected to be his baseline with his history of dementia  - Continue frequent reorientation with plans of discharge back to [facility] which is his home environment once medically cleared later today  Cognitive impairment   Assessment & Plan    - Appreciate geriatric service evaluation and recommendations  - Patient has baseline dementia, Gracie Conner is being weaned  Polypharmacy   Assessment & Plan    Appreciate geriatric service evaluation and recommendations with plan to wean Ativan, Seroquel, Namenda  Ambulatory dysfunction   Assessment & Plan    - Fall precautions   - PT and OT evaluation and treatment as indicated  - Anticipate discharge back to [facility] this afternoon  Physical deconditioning   Assessment & Plan    - Fall precautions   - PT and OT evaluation and treatment as indicated  - Anticipate discharge back to [facility] this afternoon  Discharge Summary - Trauma Service   Curtis Cordova 80 y o  male MRN: [id number]  Unit/Bed#: [facility] 835-01 Encounter: [id number]    Admission Date: 11/13/2018     Discharge Date: 11/15/2018    Admitting Diagnosis: Closed fracture of nasal bone, initial encounter [S02  2XXA]  Injury, unspecified, initial encounter [T14 90XA]  Closed nondisplaced fracture of second cervical vertebra, unspecified fracture morphology, initial encounter ([facility] ) [S12 101A]  Closed fracture of left orbital floor, initial encounter ([facility] ) [S02 32XA]  Unspecified multiple injuries, initial encounter [T07  XXXA]    Discharge Diagnosis: See above  Attending and Service: Dr Daija Horton      Consulting Physician(s):     1  [facility]'s Neurosurgery  2  OMS  3  Geriatrics  Imaging and Procedures Performed:     Xr Spine Cervical 2 Or 3 Vw Injury    Result Date: 11/14/2018  Impression: 1  Limited evaluation of the atlantoaxial axillary region and cervicothoracic junction due to patient rotation and difficulty in patient positioning  2  Previously questioned C2 fracture is not well seen radiographically  Workstation performed: [id number]     Trauma - Ct Head Wo Contrast    Result Date: 11/13/2018  Impression: No acute intracranial abnormality  Small vessel ischemic changes  Minimally depressed fracture of the left orbital floor with secondary hemorrhage in the maxillary sinus  No entrapment of the inferior rectus muscle  Nondisplaced nasal bone fractures  I personally discussed this study with Dr Gris Barboza on 11/13/2018 at 7:24 PM  Workstation performed: [id number]     Cta Neck W Wo Contrast    Result Date: 11/14/2018  Impression: No evidence of dissection, aneurysm or hemodynamically significant stenosis  Left inferior orbital floor fracture with near complete opacification of left maxillary sinus with blood  Workstation performed: [id number]     Trauma - Ct Spine Cervical Wo Contrast    Result Date: 11/13/2018  Impression: Suspect nondisplaced linear fracture through the right lateral mass of C2  No acute subluxation or impingement upon the spinal canal at any level  Multilevel degenerative disc disease  I personally discussed this study with Sarah SULTANA on 11/13/2018 at 7:25 PM   Workstation performed: [id number]     Xr Chest 1 View    Result Date: 11/14/2018  Impression: No active cardiopulmonary disease on limited supine imaging  Upright imaging may be helpful if clinically appropriate  Workstation performed: [id number]     Xr Trauma Multiple    Result Date: 11/13/2018  Impression: No active cardiopulmonary disease on limited supine imaging  Upright imaging may be helpful if clinically appropriate  Workstation performed: [facility]: Liya Ogden is a 59-year-old male who presented as a level B trauma alert via ambulance following a fall from standing at his nursing facility  He is not supposed to be on his feet without assistance, but stood up at dinner, became unsteady and fell  He struck his head on the ground without loss of consciousness  Patient does not recall the event  He complained only of head pain at the time of his presentation  On his initial trauma evaluation, his primary survey was notable for a decreased GCS to 14 with 1 being off for confusion, but was otherwise unremarkable  On secondary survey, he had a laceration above his left eye; the remainder of his secondary survey was unremarkable  His initial workup included the above-noted imaging studies  He was admitted to the trauma service status post a fall on anticoagulation with Eliquis with the following injuries:  Left orbital fracture, C2 fracture, bilateral nasal bone fractures, acquired coagulopathy secondary to Eliquis, and history of urinary retention  Consultations were placed to Neurosurgery, OMS, and geriatrics  Neurosurgery evaluated the patient and recommended non operative management, that the patient remain in a cervical collar at all times, and upright cervical spine x-rays  The upright cervical spine x-rays were completed and demonstrated no change in his alignment with the C2 fracture being difficult to identify on the plain film  OMS recommended non operative intervention, sinus precautions, and outpatient follow-up if the patient developed visual changes or had a significant nasal deformity following resolution of his swelling  Geriatric service evaluated the patient and assisted in the management of his polypharmacy as well as delirium    The patient was also evaluated by the speech therapy service during the hospitalization and recommended a level 3 dysphagia diet with thin liquids  By 11/15/2018, he is deemed stable for discharge  On discharge, the patient is instructed to follow-up with the patient's primary care provider in the next 2 weeks to review the events of the patient's recent hospitalization  The patient is instructed to follow up with OMS as needed and/or if the patient develops any visual changes or has a significant nasal deformity after swelling results  The patient is instructed to follow up with [facility] as scheduled on 12/05/2018 at 10:30 AM with repeat upright x-rays of his cervical spine prior to the follow-up appointment  The patient is instructed to follow-up in the Trauma Clinic as needed  The patient should follow the provided discharge instructions  Condition at Discharge: good     Discharge instructions/Information to patient and family:   See after visit summary for information provided to patient and family  Provisions for Follow-Up Care:  See after visit summary for information related to follow-up care and any pertinent home health orders  Disposition: See After Visit Summary for discharge disposition information  Planned Readmission: No    Discharge Statement   I spent 30 minutes discharging the patient  This time was spent on the day of discharge  I had direct contact with the patient on the day of discharge  Additional documentation is required if more than 30 minutes were spent on discharge  Discharge Medications:  See after visit summary for reconciled discharge medications provided to patient and family        Elena Rodrigues PA-C  11/15/2018  9:28 AM

## 2018-11-15 NOTE — ASSESSMENT & PLAN NOTE
-PT OT evaluations pending  -[place] [facility]

## 2018-11-15 NOTE — ASSESSMENT & PLAN NOTE
- P r n  Haldol dose given overnight, this is improved this morning  He continues to be confused, but this is suspected to be his baseline with his history of dementia  - Continue frequent reorientation with plans of discharge back to [facility] which is his home environment once medically cleared later today

## 2018-11-15 NOTE — PLAN OF CARE
Problem: Potential for Falls  Goal: Patient will remain free of falls  INTERVENTIONS:  - Assess patient frequently for physical needs  -  Identify cognitive and physical deficits and behaviors that affect risk of falls    -  [place] fall precautions as indicated by assessment   - Educate patient/family on patient safety including physical limitations  - Instruct patient to call for assistance with activity based on assessment  - Modify environment to reduce risk of injury  - Consider OT/PT consult to assist with strengthening/mobility   Outcome: Progressing      Problem: Prexisting or High Potential for Compromised Skin Integrity  Goal: Skin integrity is maintained or improved  INTERVENTIONS:  - Identify patients at risk for skin breakdown  - Assess and monitor skin integrity  - Assess and monitor nutrition and hydration status  - Monitor labs (i e  albumin)  - Assess for incontinence   - Turn and reposition patient  - Assist with mobility/ambulation  - Relieve pressure over bony prominences  - Avoid friction and shearing  - Provide appropriate hygiene as needed including keeping skin clean and dry  - Evaluate need for skin moisturizer/barrier cream  - Collaborate with interdisciplinary team (i e  Nutrition, Rehabilitation, etc )   - Patient/family teaching   Outcome: Progressing

## 2018-11-15 NOTE — OCCUPATIONAL THERAPY NOTE
Occupational Therapy Screen Note      OT referral received and chart review completed  Completed OT screen  Pt is LT resident at [facility]  Recommend Pt return to SNF w/ prior level of assistance upon D/C when medically cleared  No further skilled acute care OT needs at this time       NELSON Solomon, OTR/L

## 2018-11-15 NOTE — ASSESSMENT & PLAN NOTE
- Fall precautions   - PT and OT evaluation and treatment as indicated  - Anticipate discharge back to [facility] this afternoon

## 2018-11-15 NOTE — SPEECH THERAPY NOTE
Speech-Language Pathology Bedside Swallow Evaluation      Patient Name: Prerna Lucas    PGBOD'Q Date: 11/15/2018     Problem List  Patient Active Problem List   Diagnosis    Fall    Nasal fracture    [facility]    [facility]    Polypharmacy    Acute pain due to trauma    Delirium    Cognitive impairment    Ambulatory dysfunction    Physical deconditioning    History of atrial fibrillation       Past Medical History  Past Medical History:   Diagnosis Date    Anxiety     Atrial fibrillation ([facility] )     Bipolar 1 disorder ([facility] )     Diabetes mellitus ([facility] )     Fracture of nasal bone     Hypertension     MDD (major depressive disorder)     Obstructive and reflux uropathy     Prostatic hyperplasia     Seizures ([facility] )     UTI (urinary tract infection)        Past Surgical History  History reviewed  No pertinent surgical history  Summary   Pt presented with functional appearing oral and pharyngeal stage swallowing skills with materials administered today  Risk for Aspiration: Low risk      Recommendations: soft/level 3 diet and thin liquids     Recommended Form of Meds: whole with liquid     Aspiration precautions and compensatory swallowing strategies: upright posture and only feed when fully alert          Current Medical Status  Pt is a 80 y o  male who presented to [facility]/p fall at [facility]  Patient found to have left orbital fx and C2 fx  Admitted with C -collar  Attempted to see patient in ED yesterday, but RN asked to hold on official swallow eval as patient was very confused and agitated  Patient was placed on mechanical soft diet with honey thick liquids last evening  Past medical history:  DM and HTN   Please see H&P for details    Social/Education/Vocational Hx:  Pt lives in SNF/ECF      Swallow Information   Current Risks for Dysphagia & Aspiration: AMS     Current Symptoms/Concerns: none observed     Current Diet: mechanically altered/level 2 diet and honey thick liquids      Baseline Diet: regular diet and thin liquids      Baseline Assessment   Behavior/Cognition: alert    Speech/Language Status: able to participate in conversation and able to follow commands    Patient Positioning: upright in bed    Pain Status/Interventions/Response to Interventions: No report of or nonverbal indications of pain  Swallow Mechanism Exam   Oral-motor structures and function are WNL for symmetry, strength, ROM & coordination  Facial: symmetrical  Labial: WFL  Lingual: WFL  Velum: symmetrical  Mandible: adequate ROM  Dentition: adequate  Vocal quality:clear/adequate       Consistencies Assessed and Performance   Consistencies Administered: thin liquids, nectar thick, honey thick, puree, mechanical soft solids and hard solids  Specific materials administered included puree waffle, banana and cheyenne cracker with honey, nectar and thin liquids     Oral Stage: WFL  Mastication was adequate with the materials administered today  Bolus formation and transfer were functional with no significant oral residue noted  No overt s/s reduced oral control  Pharyngeal Stage: WFL    Swallow Mechanics:  Swallowing initiation appeared prompt  Laryngeal rise was palpated and judged to be within functional limits  No coughing, throat clearing, change in vocal quality or respiratory status noted today  Esophageal Concerns: none reported    Summary and Recommendations (see above)    Results Reviewed with: RN     Treatment Recommended: Dysphagia therapy      Frequency of treatment: 1-2 f/u sessions     Patient Stated Goal: None stated     Dysphagia Goals per SLP: pt will tolerate trials of regular solids  with thin liquids  without s/s of aspiration x2    Pt/Family Education: initiated  Pt and caregivers would benefit from/require continued education      Speech Therapy Prognosis   Prognosis: good    Prognosis Considerations: cognitive status

## 2018-11-15 NOTE — PROGRESS NOTES
Progress Note - Britney Hart 6/6/1931, 80 y o  male MRN: [id number]    Unit/Bed#: [facility] 835-01 Encounter: [id number]    Primary Care Provider: Alexandra Lubin DO   Date and time admitted to hospital: 11/13/2018  6:54 PM        Fall   Assessment & Plan    - PT/OT Consultation pending  - Geriatrics recommendations appreciated     * C2 cervical fracture [facility])   Assessment & Plan    - Neurosurgery recommendations appreciated  - Aspen Cervical Collar   - Pain Management   - Neuro checks Q 2 hours   - CTA shows L inferior orbital blowout fracture     Closed fracture of left orbital floor [facility])   Assessment & Plan    - OMFS Consultation appreciated  - non-operative management/regular diet and f/u prn   - Pain Management      Nasal fracture   Assessment & Plan    - OMFS Consultation appreciated  - sinus precautions  - Pain Management      Acute pain due to trauma   Assessment & Plan    -continue scheduled Tylenol and Lidoderm patches with oxycodone as needed for moderate to severe pain  -continue bowel regimen     Ambulatory dysfunction   Assessment & Plan    -PT OT evaluations pending  -patient will be discharged back to [facility]     Cognitive impairment   Assessment & Plan    -please see above  -appreciate geriatrics evaluation  -patient has baseline dementia, Pati Murillo is being weaned     Delirium   Assessment & Plan    -p r n   Haldol dose given overnight, this is improved this morning that he continues to be confused  -continue frequent reorientation with plans of discharge back to [facility] which is his home environment once medically cleared later today     Physical deconditioning   Assessment & Plan    -PT OT evaluations pending  -discharge back to [facility] when medically cleared     Polypharmacy   Assessment & Plan    -geriatrics recommendations appreciated:  Weaning Ativan, Seroquel, Namenda     History of atrial fibrillation   Assessment & Plan    -discontinue home Eliquis  -in setting dementia, ambulatory dysfunction, new cervical spine injury requiring bracing would recommend having risks versus benefit discussion regarding resume shin of Eliquis in light of fall risk prior to resuming  No contraindication to resuming from a trauma standpoint  Would recommend against however in setting of high risk of falls  Subjective/Objective   Chief Complaint: Demented    Subjective: Patient unable to provide any reliable history 2/2 advanced dementia  Although he does state that everything is "fine and dandy" this morning       Objective:     Meds/Allergies   Prescriptions Prior to Admission   Medication Sig Dispense Refill Last Dose    acetaminophen (TYLENOL) 325 mg tablet Take 650 mg by mouth every 6 (six) hours as needed for mild pain       apixaban (ELIQUIS) 5 mg Take 5 mg by mouth 2 (two) times a day       Cholecalciferol 1000 units capsule Take 2,000 Units by mouth daily       docusate sodium (COLACE) 100 mg capsule Take 100 mg by mouth 2 (two) times a day       escitalopram (LEXAPRO) 5 mg tablet Take 5 mg by mouth daily       ferrous sulfate 325 (65 Fe) mg tablet Take 325 mg by mouth daily with breakfast       finasteride (PROSCAR) 5 mg tablet Take 5 mg by mouth daily       gabapentin (NEURONTIN) 300 mg capsule Take 100 mg by mouth 4 (four) times a day       lidocaine (LMX) 4 % cream Apply topically as needed for mild pain       Linagliptin (TRADJENTA) 5 MG TABS Take 5 mg by mouth daily       LORazepam (ATIVAN) 0 5 mg tablet Take 0 5 mg by mouth 2 (two) times a day       LORazepam (ATIVAN) 0 5 mg tablet Take 0 5 mg by mouth daily at bedtime       memantine (NAMENDA) 10 mg tablet Take 10 mg by mouth 2 (two) times a day       omeprazole (PriLOSEC) 20 mg delayed release capsule Take 20 mg by mouth daily       QUEtiapine (SEROquel) 50 mg tablet Take 50 mg by mouth daily at bedtime       senna-docusate sodium (SENOKOT S) 8 6-50 mg per tablet Take 1 tablet by mouth daily       tamsulosin (FLOMAX) 0 4 mg Take 0 4 mg by mouth daily with dinner          Vitals: Blood pressure 167/78, pulse 77, temperature 97 9 °F (36 6 °C), temperature source Oral, resp  rate 20, height 5' 10" (1 778 m), weight 90 kg (198 lb 6 6 oz), SpO2 95 %  Body mass index is 28 47 kg/m²  SpO2: SpO2: 95 %    ABG: No results found for: PHART, MAN0EQA, PO2ART, GGJ6HEE, V3JYFROA, BEART, SOURCE      Intake/Output Summary (Last 24 hours) at 11/15/18 0848  Last data filed at 11/15/18 0827   Gross per 24 hour   Intake             1616 ml   Output              224 ml   Net             1392 ml       Invasive Devices     Peripheral Intravenous Line            Peripheral IV 11/15/18 Right Forearm less than 1 day                Nutrition/GI Proph/Bowel Reg: dysphagia 2 / protonix / senna and colace    Physical Exam   Constitutional: He appears well-developed and well-nourished  No distress  HENT:   Head: Normocephalic and atraumatic  Eyes: Pupils are equal, round, and reactive to light  Conjunctivae and EOM are normal  Right eye exhibits no discharge  Left eye exhibits no discharge  No scleral icterus  Ecchymosis surrounding L orbit  No signs of entrapment   Neck: Normal range of motion  Neck supple  No JVD present  Cardiovascular: Normal rate, regular rhythm and normal heart sounds  Exam reveals no gallop and no friction rub  No murmur heard  Pulmonary/Chest: Effort normal and breath sounds normal  No stridor  No respiratory distress  He has no wheezes  He has no rales  Abdominal: Soft  Bowel sounds are normal  He exhibits no distension  There is no tenderness  There is no guarding  Musculoskeletal: Normal range of motion  He exhibits no edema, tenderness or deformity  Neurological: He is alert  No cranial nerve deficit  He exhibits normal muscle tone  Skin: Skin is warm and dry  Capillary refill takes less than 2 seconds  No rash noted  He is not diaphoretic  No erythema  No pallor     Nursing note and vitals reviewed  Lab Results: Results: I have personally reviewed pertinent reports  Imaging/EKG Studies: Results: I have personally reviewed pertinent reports      Other Studies: N/A  VTE Prophylaxis: lovenox

## 2018-11-15 NOTE — ASSESSMENT & PLAN NOTE
- Appreciate OMS evaluation and recommendations   - Non operative management  - Maintain sinus precautions  - Continue analgesic regimen as needed  - Stable for discharge on 11/15/2018 with outpatient follow-up as needed with OMS if patient is experiencing visual symptoms and/or has a nasal deformity once swelling resolved

## 2018-11-15 NOTE — ASSESSMENT & PLAN NOTE
-PT OT evaluations pending  -[place] [facility] [place]

## 2018-11-15 NOTE — PHYSICIAN ADVISOR
Current patient class: Inpatient  The patient is currently on Hospital Day: 2 at [facility]      The patient was admitted to the hospital at [phone] on 11/14/18 for the following diagnosis:  Closed fracture of nasal bone, initial encounter [S02  2XXA]  Fall, initial encounter Stef Jean  XXXA]  Closed nondisplaced fracture of second cervical vertebra, unspecified fracture morphology, initial encounter ([facility] ) [S12 101A]  Closed fracture of left orbital floor, initial encounter ([facility] ) [S02 32XA]  Unspecified multiple injuries, initial encounter [T07  XXXA]       There is documentation in the medical record of an expected length of stay of at least 2 midnights  The patient is therefore expected to satisfy the 2 midnight benchmark and given the 2 midnight presumption is appropriate for INPATIENT ADMISSION  Given this expectation of a satisfying stay, CMS instructs us that the patient is most often appropriate for inpatient admission under part A provided medical necessity is documented in the chart  After review of the relevant documentation, labs, vital signs and test results, the patient is appropriate for INPATIENT ADMISSION  Admission to the hospital as an inpatient is a complex decision making process which requires the practitioner to consider the patients presenting complaint, history and physical examination and all relevant testing  With this in mind, in this case, the patient was deemed appropriate for INPATIENT ADMISSION  After review of the documentation and testing available at the time of the admission I concur with this clinical determination of medical necessity  Rationale is as follows: The patient is a 80 yrs old Male who presented to the ED at 11/13/2018  6:54 PM with a chief complaint of Fall (Pt fell and hit head   Pt denies LOC but takes thinners  )     Given the need for further hospitalization, and along with the documentation of medical necessity present in the chart, the patient is appropriate for inpatient admission  The patient is expected to satisfy the 2 midnight benchmark, and will require further acute medical care  The patient does have comorbid conditions which increases the risk for significant adverse outcome  Given this the patient is appropriate for inpatient admission  The patients vitals on arrival were ED Triage Vitals   Temperature Pulse Respirations Blood Pressure SpO2   11/13/18 1855 11/13/18 1855 11/13/18 1855 11/13/18 1855 11/13/18 1855   98 5 °F (36 9 °C) 73 20 170/91 97 %      Temp Source Heart Rate Source Patient Position - Orthostatic VS BP Location FiO2 (%)   11/14/18 0100 11/13/18 2207 11/14/18 0100 11/13/18 2207 --   Oral Monitor Lying Right arm       Pain Score       11/14/18 0100       3           Past Medical History:   Diagnosis Date    Anxiety     Atrial fibrillation (HCC)     Bipolar 1 disorder (HCC)     Diabetes mellitus (HCC)     Fracture of nasal bone     Hypertension     MDD (major depressive disorder)     Obstructive and reflux uropathy     Prostatic hyperplasia     Seizures ([facility] )     UTI (urinary tract infection)      History reviewed  No pertinent surgical history          Consults have been placed to:   IP CONSULT TO NEUROSURGERY  IP CONSULT TO GERONTOLOGY  IP CONSULT TO ORAL AND MAXILLOFACIAL SURGERY  IP CONSULT TO CASE MANAGEMENT    Vitals:    11/14/18 0838 11/14/18 1407 11/14/18 1601 11/14/18 1740   BP: (!) 185/114 153/94 (!) 177/85 (!) 176/88   BP Location: Right arm Right arm Right arm Right arm   Pulse: 89 84 80 81   Resp: 18 18 18 18   Temp:    98 9 °F (37 2 °C)   TempSrc:    Oral   SpO2: 95% 100% 98% 95%   Weight:    90 kg (198 lb 6 6 oz)   Height:    5' 10" (1 778 m)       Most recent labs:    Recent Labs      11/13/18   1859   11/14/18   0221  11/14/18   0524   WBC   --    < >   --   8 64   HGB   --    < >   --   11 7*   HCT   --    < >   --   36 1*   PLT   --    < >   --   151   K  4 0   --    -- 4  4   CALCIUM  8 5   --    --   8 4   BUN  18   --    --   16   CREATININE  1 03   --    --   0 95   INR  1 02   --    --    --    TROPONINI   --    < >  <0 02   --     < > = values in this interval not displayed  Scheduled Meds:  Current Facility-Administered Medications:  acetaminophen 975 mg Oral Q8H [address] Cori Costa PA-C    dextrose 5 % and sodium chloride 0 45 % with KCl 20 mEq/L 60 mL/hr Intravenous Continuous Dino Lynch MD Last Rate: 60 mL/hr (11/14/18 1900)   docusate sodium 100 mg Oral BID Dino Lynch MD    enoxaparin 40 mg Subcutaneous Q24H [address] Santi Nava PA-C    escitalopram 5 mg Oral Daily Dino Lynch MD    ferrous sulfate 325 mg Oral Daily With Breakfast Dino Lynch MD    finasteride 5 mg Oral Daily Dino Lynch MD    gabapentin 100 mg Oral TID Dino Lynch MD    HYDROmorphone 0 2 mg Intravenous Q4H PRN James Memory, PA-C    lidocaine 1 patch Topical Daily James Memory, PA-C    memantine 10 mg Oral Daily James Memory, PA-C    oxyCODONE 2 5 mg Oral Q4H PRN James Memory, PA-C    oxyCODONE 5 mg Oral Q4H PRN James Memory, PA-C    pantoprazole 40 mg Oral Early Morning Dino Lynch MD    QUEtiapine 25 mg Oral HS James Memory, BRYAN    senna-docusate sodium 1 tablet Oral Daily Dino Lynch MD    sitaGLIPtin 25 mg Oral Daily Dino Lynch MD    tamsulosin 0 4 mg Oral Daily With Holly Smith MD      Continuous Infusions:  dextrose 5 % and sodium chloride 0 45 % with KCl 20 mEq/L 60 mL/hr Last Rate: 60 mL/hr (11/14/18 1900)     PRN Meds:  HYDROmorphone    oxyCODONE    oxyCODONE    Surgical procedures (if appropriate):

## 2018-11-19 ENCOUNTER — TELEPHONE (OUTPATIENT)
Dept: NEUROSURGERY | Facility: CLINIC | Age: 83
End: 2018-11-19

## 2018-11-19 NOTE — TELEPHONE ENCOUNTER
11/19/2018 barney 11/15/2018 pt is to f/u on 12/05 2018 at 10am with repat lumbar xrays script in chart , pt discharged today , signed off        [phone] spoke with charge nurse Dannielle Severino , confirmed apt with xrays

## 2018-11-30 ENCOUNTER — TELEPHONE (OUTPATIENT)
Dept: NEUROSURGERY | Facility: CLINIC | Age: 83
End: 2018-11-30

## 2018-11-30 NOTE — TELEPHONE ENCOUNTER
Spoke with Ailin Herrera from [facility] reminding patient to have Xray Cspine prior to appointment

## 2018-12-05 ENCOUNTER — TRANSCRIBE ORDERS (OUTPATIENT)
Dept: RADIOLOGY | Facility: HOSPITAL | Age: 83
End: 2018-12-05

## 2018-12-05 ENCOUNTER — TELEPHONE (OUTPATIENT)
Dept: NEUROSURGERY | Facility: CLINIC | Age: 83
End: 2018-12-05

## 2018-12-05 ENCOUNTER — HOSPITAL ENCOUNTER (OUTPATIENT)
Dept: RADIOLOGY | Facility: HOSPITAL | Age: 83
Discharge: HOME | End: 2018-12-05
Payer: MEDICARE

## 2018-12-05 ENCOUNTER — OFFICE VISIT (OUTPATIENT)
Dept: NEUROSURGERY | Facility: CLINIC | Age: 83
End: 2018-12-05
Payer: MEDICARE

## 2018-12-05 VITALS
SYSTOLIC BLOOD PRESSURE: 120 MMHG | BODY MASS INDEX: 28.47 KG/M2 | DIASTOLIC BLOOD PRESSURE: 68 MMHG | TEMPERATURE: 97.6 F | HEART RATE: 78 BPM | HEIGHT: 70 IN

## 2018-12-05 DIAGNOSIS — S12.101S CLOSED NONDISPLACED FRACTURE OF SECOND CERVICAL VERTEBRA, UNSPECIFIED FRACTURE MORPHOLOGY, SEQUELA: Primary | ICD-10-CM

## 2018-12-05 DIAGNOSIS — S12.100A CLOSED DISPLACED FRACTURE OF SECOND CERVICAL VERTEBRA, UNSPECIFIED FRACTURE MORPHOLOGY, INITIAL ENCOUNTER (HCC): ICD-10-CM

## 2018-12-05 PROCEDURE — 72040 X-RAY EXAM NECK SPINE 2-3 VW: CPT

## 2018-12-05 PROCEDURE — 99213 OFFICE O/P EST LOW 20 MIN: CPT | Performed by: PHYSICIAN ASSISTANT

## 2018-12-05 NOTE — LETTER
December 6, 2018     Henrique Parra DO  [address] [facility]    Patient: Mariana Castro   YOB: 1931   Date of Visit: 12/5/2018       Dear Dr Sai Haley: Thank you for referring Kaye Senior to me for evaluation  Below are my notes for this consultation  If you have questions, please do not hesitate to call me  I look forward to following your patient along with you  Sincerely,        Kayce Karimi PA-C        CC: MD Kayce Snell PA-C  12/6/2018  6:03 AM  Sign at close encounter  Assessment/Plan:     Diagnoses and all orders for this visit:    Closed nondisplaced fracture of second cervical vertebra, unspecified fracture morphology, sequela  -     XR spine cervical complete 4 or 5 vw non injury; Future  -     Cervical Collar Pads          Discussion / Summary: This is a 80 y o  male  who presents for hospital consult (11/14/18) follow up for nondisplaced linear fracture through right lateral mass of C2  Patient has been maintained in c-collar for 3 weeks  C-spine xray ( 12/5/18 )  was reviewed in detail by Dr Christy Gomes and compared to previous c-spine xray 11/14/18  Cervical alignment appears stable  There is straightening of cervical lordosis  No radiographically appreciated fracture  There is multilevel disc space narrowing and endplate degenerative changes most pronounced at the C5-C6 and C3-C4 level  The 11/13/18 CT C-spine was also reviewed by Dr Christy Gomes  Patient is advised to continue wearing C-collar at all times  New c-collar pads were placed on patient's [place] c-collar while he was in office  Patient is to wear [place] c-collar at all times other then wearing [place] c-collar when showering  No driving in setting of having to wear c-collar  He is to refrain from strenuous activity  Patient is to refrain from lifting, pulling, or pushing more then 5-10 lbs  Recommend patient take fall precautions    Recommend patient refrain from activity that increases chance of falling or injury to neck  Patient is to follow up in 2-3 weeks with C-spine xray with flex/ext views  He is to have xray completed at [facility] (No Mobile imaging)  Pending result and clinical status at next office visit to consider wean from c-collar after next follow up visit  Patient is to contact our office or present to hospital Emergency Room if experience worsening or new neck pain, sensory or motor change, bladder or bowel dysfunction, or other neurological change  Patient and his guardian expressed understanding and agreement     ________________________________________________________________________________________________    Subjective:      Patient ID: Panchito Caanles is a 80 y o  male with PMH of delirium, cognitive impairment, history of falls, AFib, ambulatory dysfunction who presented to [facility] emergency department 11/13/18 status post fall at nursing facility  During work up he was found to have nondisplaced linear fracture through the right lateral mass of C2  He was seen in neurosurgical consultation 11/14/2018 advised management with cervical collar  HPI He had follow up C-spine xray 12/5/18 and presents for follow up visit  Mr Corazon Montelongo is accompanied with Martin Medico (his guardian)  Mr Corazon Montelongo is currently residing at [address]  Patient comes to office on stretcher transport  He is wearing [place] c-collar although a towel is covering the chin rest on C-collar as pad is not present on chin rest of c-collar  Mr Corazon Montelongo denies neck pain  He denies pain, numbness, tingling, or weakness of his upper extremities  He denies sensory disturbance of torso or lower extremities  He denies weakness of lower extremities  Patient reports he has no problems eating/feeding self  Papers from nursing center indicated he is receiving PT, OT, and ST    Martin Medico reports he is probably standing/walking some with therapy  He otherwise uses a wheelchair to get around  The following portions of the patient's history were reviewed and updated as appropriate: allergies, current medications, past family history, past medical history, past social history and past surgical history  Review of Systems   Constitutional: Negative for fever  HENT: Negative for trouble swallowing  Respiratory: Negative for shortness of breath  Gastrointestinal: Negative for nausea  Musculoskeletal: Negative for neck pain  Neurological: Negative for weakness and numbness  Psychiatric/Behavioral: Negative for agitation  Objective:      /68 (BP Location: Right arm, Patient Position: Supine, Cuff Size: Standard)   Pulse 78   Temp 97 6 °F (36 4 °C) (Temporal)   Ht 5' 10" (1 778 m)   BMI 28 47 kg/m²           Physical Exam   Constitutional: He appears well-developed and well-nourished  No distress  He is wearing [place] c-collar although a towel is covering the chin rest on C-collar as pad is not present on chin rest of c-collar  Eyes: Conjunctivae are normal    Pulmonary/Chest: Effort normal    Musculoskeletal:        Cervical back: He exhibits no tenderness  Neurological: He is alert  Reflex Scores:       Tricep reflexes are Right triceps reflex grade: +1-2  and Left triceps reflex grade: +1-2  [place] Bicep reflexes are 2+ on the right side and 2+ on the left side  Brachioradialis reflexes are 2+ on the right side and 2+ on the left side  Patellar reflexes are 2+ on the right side and 2+ on the left side  Achilles reflexes are 1+ on the right side and 1+ on the left side  Skin: Skin is warm and dry  No breakdown of skin underlying c-collar   Psychiatric: He has a normal mood and affect  His speech is normal        Neurologic Exam     Mental Status   Oriented to person (Knows month/day of birth but not year of birth)     (Knows he is at hospital but said name of hospital is "[place]")  Disoriented to year and month  Oriented to season  Speech: speech is normal   Level of consciousness: alert    Motor Exam     Motor:  Shoulder abduction 5/5 bilaterally  Elbow flexion/extension 5/5 bilaterally  Wrist flexion/extension 5/5 bilaterally  Finger  / abduction 5/5 bilaterally  Hip flexion/abduction/adduction 5/5 bilaterally  Knee flexion/extension 5/5 bilaterally  Ankle DF/PF 5/5 bilaterally  Great toe DF 5/5 bilaterally  Sensory Exam     Sensation to pinprick intact b/l upper extremities, torso, and b/l lower extremities  JPS and Vibratory sense intact b/l thumbs and great toes  Gait, Coordination, and Reflexes     Reflexes   Right brachioradialis: 2+  Left brachioradialis: 2+  Right biceps: 2+  Left biceps: 2+  Right triceps reflex grade: +1-2  Left triceps reflex grade: +1-2  Right patellar: 2+  Left patellar: 2+  Right achilles: 1+  Left achilles: 1+  Right plantar: normal  Left plantar: normal  Right ankle clonus: absent  Left ankle clonus: absent      Imaging study:    12/5/18 [facility] C-spine xray -- per report: " CERVICAL SPINE     INDICATION:   S12 100A: Unspecified displaced fracture of second cervical vertebra, initial encounter for closed fracture        COMPARISON:  X-ray 11/14/2018 and CT 11/13/2018     VIEWS:  XR SPINE CERVICAL 2 OR 3 VW INJURY         FINDINGS:     There is  straightening of normal cervical lordosis      No radiographically appreciable fracture       Multilevel disc space narrowing and endplate degenerative changes more pronounced at levels C5-6 and C3-4  Anterior endplate osteophyte formation at level C4-5      The prevertebral soft tissues are within normal limits        Partially visualized lung apices appear grossly unremarkable     IMPRESSION:     Stable exam compared to x-ray 11/14/2018    Previously suspected C2 fracture on CT 11/13/2018 is not radiographically evident         Workstation performed: [id number] "

## 2018-12-05 NOTE — PATIENT INSTRUCTIONS
Old c-collar pads removed and new [place] c-collar pads were placed on patient's [place] c-collar today  Wear [employer] c-collar to shower  Otherwise wear Vista C-collar at all times  Please make sure all c-collar pads are on [place] c-collar and make sure c-collar pads cover plastic margins of c-collar  Refrain from strenuous activity  Limit lifting, pulling, pushing to no more then 5-10 lbs  No driving in setting of having to wear C-collar  Recommend you take fall precautions and refrain from activity that increases chance of fall or injury to neck  Contact our office or present to Emergency Room if experience worsening or new neck/arm pain, sensory or motor change, bladder or bowel function change, or other neurological change  Have C-spine xray (including flexion/extension views) completed at [address] shortly prior to next neurosurgery follow up visit  (No Mobile imaging)

## 2018-12-05 NOTE — PROGRESS NOTES
Assessment/Plan:     Diagnoses and all orders for this visit:    Closed nondisplaced fracture of second cervical vertebra, unspecified fracture morphology, sequela  -     XR spine cervical complete 4 or 5 vw non injury; Future  -     Cervical Collar Pads          Discussion / Summary: This is a 80 y o  male  who presents for hospital consult (11/14/18) follow up for nondisplaced linear fracture through right lateral mass of C2  Patient has been maintained in c-collar for 3 weeks  C-spine xray ( 12/5/18 )  was reviewed in detail by Dr Sonia Potts and compared to previous c-spine xray 11/14/18  Cervical alignment appears stable  There is straightening of cervical lordosis  No radiographically appreciated fracture  There is multilevel disc space narrowing and endplate degenerative changes most pronounced at the C5-C6 and C3-C4 level  The 11/13/18 CT C-spine was also reviewed by Dr Sonia Potts  Patient is advised to continue wearing C-collar at all times  New c-collar pads were placed on patient's [place] c-collar while he was in office  Patient is to wear [place] c-collar at all times other then wearing [place] c-collar when showering  No driving in setting of having to wear c-collar  He is to refrain from strenuous activity  Patient is to refrain from lifting, pulling, or pushing more then 5-10 lbs  Recommend patient take fall precautions  Recommend patient refrain from activity that increases chance of falling or injury to neck  Patient is to follow up in 2-3 weeks with C-spine xray with flex/ext views  He is to have xray completed at [place] [id number] facility (No Mobile imaging)  Pending result and clinical status at next office visit to consider wean from c-collar after next follow up visit  Patient is to contact our office or present to hospital Emergency Room if experience worsening or new neck pain, sensory or motor change, bladder or bowel dysfunction, or other neurological change      Patient and his guardian expressed understanding and agreement     ________________________________________________________________________________________________    Subjective:      Patient ID: Panchito Canales is a 80 y o  male with PMH of delirium, cognitive impairment, history of falls, AFib, ambulatory dysfunction who presented to [facility] emergency department 11/13/18 status post fall at nursing facility  During work up he was found to have nondisplaced linear fracture through the right lateral mass of C2  He was seen in neurosurgical consultation 11/14/2018 advised management with cervical collar  HPI He had follow up C-spine xray 12/5/18 and presents for follow up visit  Mr Corazon Montelongo is accompanied with Mario Molina (his guardian)  Mr Corazon Montelongo is currently residing at [address]  Patient comes to office on stretcher transport  He is wearing [place] c-collar although a towel is covering the chin rest on C-collar as pad is not present on chin rest of c-collar  Mr Corazon Montelongo denies neck pain  He denies pain, numbness, tingling, or weakness of his upper extremities  He denies sensory disturbance of torso or lower extremities  He denies weakness of lower extremities  Patient reports he has no problems eating/feeding self  Papers from nursing center indicated he is receiving PT, OT, and ST  Martin Medicdemarco reports he is probably standing/walking some with therapy  He otherwise uses a wheelchair to get around  The following portions of the patient's history were reviewed and updated as appropriate: allergies, current medications, past family history, past medical history, past social history and past surgical history  Review of Systems   Constitutional: Negative for fever  HENT: Negative for trouble swallowing  Respiratory: Negative for shortness of breath  Gastrointestinal: Negative for nausea  Musculoskeletal: Negative for neck pain  Neurological: Negative for weakness and numbness  Psychiatric/Behavioral: Negative for agitation  Objective:      /68 (BP Location: Right arm, Patient Position: Supine, Cuff Size: Standard)   Pulse 78   Temp 97 6 °F (36 4 °C) (Temporal)   Ht 5' 10" (1 778 m)   BMI 28 47 kg/m²          Physical Exam   Constitutional: He appears well-developed and well-nourished  No distress  He is wearing [place] c-collar although a towel is covering the chin rest on C-collar as pad is not present on chin rest of c-collar  Eyes: Conjunctivae are normal    Pulmonary/Chest: Effort normal    Musculoskeletal:        Cervical back: He exhibits no tenderness  Neurological: He is alert  Reflex Scores:       Tricep reflexes are Right triceps reflex grade: +1-2  and Left triceps reflex grade: +1-2  [place] Bicep reflexes are 2+ on the right side and 2+ on the left side  Brachioradialis reflexes are 2+ on the right side and 2+ on the left side  Patellar reflexes are 2+ on the right side and 2+ on the left side  Achilles reflexes are 1+ on the right side and 1+ on the left side  Skin: Skin is warm and dry  No breakdown of skin underlying c-collar   Psychiatric: He has a normal mood and affect  His speech is normal        Neurologic Exam     Mental Status   Oriented to person (Knows month/day of birth but not year of birth)  (Knows he is at hospital but said name of hospital is "[place]")  Disoriented to year and month  Oriented to season  Speech: speech is normal   Level of consciousness: alert    Motor Exam     Motor:  Shoulder abduction 5/5 bilaterally  Elbow flexion/extension 5/5 bilaterally  Wrist flexion/extension 5/5 bilaterally  Finger  / abduction 5/5 bilaterally  Hip flexion/abduction/adduction 5/5 bilaterally  Knee flexion/extension 5/5 bilaterally  Ankle DF/PF 5/5 bilaterally  Great toe DF 5/5 bilaterally          Sensory Exam     Sensation to pinprick intact b/l upper extremities, torso, and b/l lower extremities  JPS and Vibratory sense intact b/l thumbs and great toes  Gait, Coordination, and Reflexes     Reflexes   Right brachioradialis: 2+  Left brachioradialis: 2+  Right biceps: 2+  Left biceps: 2+  Right triceps reflex grade: +1-2  Left triceps reflex grade: +1-2  Right patellar: 2+  Left patellar: 2+  Right achilles: 1+  Left achilles: 1+  Right plantar: normal  Left plantar: normal  Right ankle clonus: absent  Left ankle clonus: absent      Imaging study:    12/5/18 [facility] C-spine xray -- per report: " CERVICAL SPINE     INDICATION:   S12 100A: Unspecified displaced fracture of second cervical vertebra, initial encounter for closed fracture        COMPARISON:  X-ray 11/14/2018 and CT 11/13/2018     VIEWS:  XR SPINE CERVICAL 2 OR 3 VW INJURY         FINDINGS:     There is  straightening of normal cervical lordosis      No radiographically appreciable fracture       Multilevel disc space narrowing and endplate degenerative changes more pronounced at levels C5-6 and C3-4  Anterior endplate osteophyte formation at level C4-5      The prevertebral soft tissues are within normal limits        Partially visualized lung apices appear grossly unremarkable     IMPRESSION:     Stable exam compared to x-ray 11/14/2018    Previously suspected C2 fracture on CT 11/13/2018 is not radiographically evident         Workstation performed: [id number] "

## 2018-12-05 NOTE — TELEPHONE ENCOUNTER
Received call from [facility] reporting that the patient has become very non compliant and will not leave his collar on  Every time they replace it he removes it  Advised that he should be wearing this at all times  Offered to speak with the patient to attempt to get him to comply however she said they have tried everything and did not think it would work  She said she will continue to try to get the collar back on

## 2019-01-02 ENCOUNTER — OFFICE VISIT (OUTPATIENT)
Dept: NEUROSURGERY | Facility: CLINIC | Age: 84
End: 2019-01-02
Payer: MEDICARE

## 2019-01-02 ENCOUNTER — HOSPITAL ENCOUNTER (OUTPATIENT)
Dept: RADIOLOGY | Facility: HOSPITAL | Age: 84
Discharge: HOME | End: 2019-01-02
Payer: MEDICARE

## 2019-01-02 VITALS
HEART RATE: 80 BPM | SYSTOLIC BLOOD PRESSURE: 130 MMHG | BODY MASS INDEX: 28.47 KG/M2 | DIASTOLIC BLOOD PRESSURE: 78 MMHG | HEIGHT: 70 IN | TEMPERATURE: 98.6 F

## 2019-01-02 DIAGNOSIS — S12.101S CLOSED NONDISPLACED FRACTURE OF SECOND CERVICAL VERTEBRA, UNSPECIFIED FRACTURE MORPHOLOGY, SEQUELA: ICD-10-CM

## 2019-01-02 DIAGNOSIS — S12.101D CLOSED NONDISPLACED FRACTURE OF SECOND CERVICAL VERTEBRA WITH ROUTINE HEALING, UNSPECIFIED FRACTURE MORPHOLOGY, SUBSEQUENT ENCOUNTER: Primary | ICD-10-CM

## 2019-01-02 PROCEDURE — 72050 X-RAY EXAM NECK SPINE 4/5VWS: CPT

## 2019-01-02 PROCEDURE — 99213 OFFICE O/P EST LOW 20 MIN: CPT | Performed by: PHYSICIAN ASSISTANT

## 2019-01-02 RX ORDER — CHOLECALCIFEROL (VITAMIN D3) 125 MCG
5 CAPSULE ORAL
COMMUNITY

## 2019-01-02 RX ORDER — LEVOTHYROXINE SODIUM 0.03 MG/1
50 TABLET ORAL DAILY
COMMUNITY

## 2019-01-02 NOTE — PROGRESS NOTES
Assessment/Plan:    Closed nondisplaced fracture of second cervical vertebra with routine healing, unspecified fracture morphology, subsequent encounter          Discussion / Summary: This is a 80 y o  male  who presents for follow up for history of nondisplaced linear fracture through right lateral mass of C2  Luis Simple Patient has been maintained in c-collar 7 weeks  C-spine xray ( 1/2/19 )  was reviewed in detail by Dr Melly Díaz  The previously identified identified fracture C2 is not seen on radiograph  No evidence of dynamic instability on flexion or extension xray  There is multilevel degenerative changes  [place] c-collar pad was placed on chin rest of his [place] c-collar as he did not have pad upon presentation to office  Patient denies neck pain  Clinically patient demonstrates good cervical ROM with flexion and extension and denies neck pain with such motion  At this juncture he may discontinue c-collar  He may increase activity as tolerated from neurosurgical standpoint but recommend he take fall precautions and refrain from activity that increases chance of fall or injury to neck  He is advised follow up with his Primary Care Provider for evaluation / management of bony demineralization (ie  possible osteoporosis)  Recommend patient / family / care center contact our office or present to hospital Emergency Room if he experience worsening or new neck pain, sensory or motor change, bladder or bowel dysfunction, or other neurological change  Patient / daughter expressed understanding and agreement     _______________________________________________________________________________________    Subjective:      Patient ID: Cleveland Iqbal is a 80 y o  male with PMH of delirium, cognitive impairment, history of falls, AFib, ambulatory dysfunction who presented to [facility] emergency department 11/13/18 status post fall at nursing facility   During work up he was found to have nondisplaced linear fracture through the right lateral mass of C2  He was seen in neurosurgical consultation 11/14/2018 advised management with cervical collar                                     He presents today for ongoing routine follow up  He comes to office on transport stretcher with two transport attendants  He is accompanied by his daughter Nela Ferguson)   HPI   Patient has [place] c-collar on but his c-collar does not have a pad on chin rest of c-collar  He keeps tucking his chin down underneath the chin rest     Patient denies neck pain  He denies pain, numbness, tingling, or weakness of his upper extremities  He denies sensory disturbance of torso or lower extremities  He reports soreness of his right foot  He also reports some numbness right foot which eases with moving foot  He denies weakness of lower extremities  Daughter reports she believes he is typically in wheelchair for mobilizing  He denies any problems eating and reports he feeds self  Nursing center papers from [facility] indicated he has active orders for PT/OT/ST  He denies bladder or bowel incontinence or saddle paresthesias  The following portions of the patient's history were reviewed and updated as appropriate: allergies, current medications, past family history, past medical history, past social history and past surgical history  Review of Systems   Constitutional: Negative for fever  Respiratory: Negative for shortness of breath  Gastrointestinal: Negative for vomiting  Denies bowel dysfunction   Genitourinary: Negative for difficulty urinating  Musculoskeletal: Negative for neck pain  Neurological:        See HPI   Psychiatric/Behavioral: Negative for agitation           Objective:      /78 (BP Location: Left arm, Patient Position: Sitting, Cuff Size: Standard)   Pulse 80   Temp 98 6 °F (37 °C) (Temporal)   Ht 5' 10" (1 778 m)   BMI 28 47 kg/m²          Physical Exam   Constitutional: He appears well-developed and well-nourished  No distress  Wearing Vista c-collar  He habitually tucks chin under chin rest of c-collar  Eyes: Conjunctivae are normal    Pulmonary/Chest: Effort normal    Musculoskeletal:        Cervical back: He exhibits no tenderness  He has good cervical ROM with cervical flexion and extension of neck and denies neck pain doing such motion  Neurological: He is alert  Reflex Scores:       Tricep reflexes are Right triceps reflex grade: +1-2  and Left triceps reflex grade: +1-2  Wilder Spain Bicep reflexes are 1+ on the right side and 1+ on the left side  Brachioradialis reflexes are 1+ on the right side and 1+ on the left side  Patellar reflexes are 1+ on the right side and 1+ on the left side  Achilles reflexes are 1+ on the right side and 1+ on the left side  Skin: Skin is warm and dry  No breakdown of skin underlying c-collar  Psychiatric: He has a normal mood and affect  Neurologic Exam     Mental Status   Oriented to person  (Knows he is at doctors office but said in [facility] )  Disoriented to year  (When asked what month it is he replies "Is it January? ")  Level of consciousness: alert    Motor Exam     Motor:  Shoulder abduction 5/5 bilaterally  (Sorness of shoulders)  Elbow flexion/extension 5/5 bilaterally  Wrist flexion/extension 5/5 bilaterally  Finger  / abduction 5/5 bilaterally  Hip flexion/abduction/adduction 5/5 bilaterally  Knee flexion/extension 5/5 bilaterally  Ankle DF/PF 5/5 bilaterally  Great toe DF 5/5 bilaterally  Sensory Exam     Sensation to pinprick is more sensitive right foot then left foot  Sensation to pinprick otherwise intact b/l upper extremities, torso, and b/l lower extremities  JPS and Vibratory sense intact b/l thumbs and great toes         Gait, Coordination, and Reflexes     Reflexes   Right brachioradialis: 1+  Left brachioradialis: 1+  Right biceps: 1+  Left biceps: 1+  Right triceps reflex grade: +1-2  Left triceps reflex grade: +1-2  Right patellar: 1+  Left patellar: 1+  Right achilles: 1+  Left achilles: 1+  Right plantar: normal  Left plantar: normal  Right ankle clonus: absent  Left ankle clonus: absent    Patient laying on transport stretcher         Imaging study:    1/2/19 [facility] C-spine xray -- per report: " CERVICAL SPINE     INDICATION:   S12 101S: Unspecified nondisplaced fracture of second cervical vertebra, sequela      COMPARISON:  Plain radiographs December 5, 2018 as well as CT cervical spine November 13, 2018      VIEWS:  XR SPINE CERVICAL COMPLETE 4 OR 5 VW NON INJURY   Images: 6     FINDINGS:  Osseous structures are demineralized  No acute fracture or destructive osseous lesion  The previously identified questionable nondisplaced fracture involving the right lateral mass of C2, is not seen on plain radiographs      No evidence of dynamic instability seen on flexion or extension      Disc space narrowing diffusely throughout the cervical spine with associated uncinate process hypertrophy and facet hypertrophic changes  Bridging anterior osteophytic spur formation      The prevertebral soft tissues are within normal limits      IMPRESSION:  1  Degenerative changes of the cervical spine  No evidence of cervical instability    2   Nonvisualization of the questionable fracture involving the right lateral mass of C2        If there is continued concern for fracture involving the right lateral mass of C2, consider follow-up MRI of the cervical spine with coronal STIR sequencing      The study was marked in EPIC for immediate notification      Workstation performed: [id number] "

## 2019-01-02 NOTE — LETTER
January 4, 2019     Sara Lugo MD  [address] [id number]    Patient: Muriel Lovett   YOB: 1931   Date of Visit: 1/2/2019       Dear Dr Nessa Shore: Thank you for referring Kaiden Bradford to me for evaluation  Below are my notes for this consultation  If you have questions, please do not hesitate to call me  I look forward to following your patient along with you  Sincerely,        Sherrie Avila PA-C        CC: MD Sherrie Hatfield PA-C  1/4/2019  5:08 AM  Sign at close encounter  Assessment/Plan:    Closed nondisplaced fracture of second cervical vertebra with routine healing, unspecified fracture morphology, subsequent encounter          Discussion / Summary: This is a 80 y o  male  who presents for follow up for history of nondisplaced linear fracture through right lateral mass of C2  Matt Henriquez Patient has been maintained in c-collar 7 weeks  C-spine xray ( 1/2/19 )  was reviewed in detail by Dr Radha Sarah  The previously identified identified fracture C2 is not seen on radiograph  No evidence of dynamic instability on flexion or extension xray  There is multilevel degenerative changes  [place] c-collar pad was placed on chin rest of his [place] c-collar as he did not have pad upon presentation to office  Patient denies neck pain  Clinically patient demonstrates good cervical ROM with flexion and extension and denies neck pain with such motion  At this juncture he may discontinue c-collar  He may increase activity as tolerated from neurosurgical standpoint but recommend he take fall precautions and refrain from activity that increases chance of fall or injury to neck  He is advised follow up with his Primary Care Provider for evaluation / management of bony demineralization (ie  possible osteoporosis)       Recommend patient / family / care center contact our office or present to hospital Emergency Room if he experience worsening or new neck pain, sensory or motor change, bladder or bowel dysfunction, or other neurological change  Patient / daughter expressed understanding and agreement     _______________________________________________________________________________________    Subjective:      Patient ID: Kim Sutton is a 80 y o  male with PMH of delirium, cognitive impairment, history of falls, AFib, ambulatory dysfunction who presented to [facility] emergency department 11/13/18 status post fall at nursing facility  During work up he was found to have nondisplaced linear fracture through the right lateral mass of C2  He was seen in neurosurgical consultation 11/14/2018 advised management with cervical collar                                     He presents today for ongoing routine follow up  He comes to office on transport stretcher with two transport attendants  He is accompanied by his daughter Joe Mccord)   HPI   Patient has [place] c-collar on but his c-collar does not have a pad on chin rest of c-collar  He keeps tucking his chin down underneath the chin rest     Patient denies neck pain  He denies pain, numbness, tingling, or weakness of his upper extremities  He denies sensory disturbance of torso or lower extremities  He reports soreness of his right foot  He also reports some numbness right foot which eases with moving foot  He denies weakness of lower extremities  Daughter reports she believes he is typically in wheelchair for mobilizing  He denies any problems eating and reports he feeds self  Nursing center papers from [facility] indicated he has active orders for PT/OT/ST  He denies bladder or bowel incontinence or saddle paresthesias  The following portions of the patient's history were reviewed and updated as appropriate: allergies, current medications, past family history, past medical history, past social history and past surgical history      Review of Systems   Constitutional: Negative for fever  Respiratory: Negative for shortness of breath  Gastrointestinal: Negative for vomiting  Denies bowel dysfunction   Genitourinary: Negative for difficulty urinating  Musculoskeletal: Negative for neck pain  Neurological:        See HPI   Psychiatric/Behavioral: Negative for agitation  Objective:      /78 (BP Location: Left arm, Patient Position: Sitting, Cuff Size: Standard)   Pulse 80   Temp 98 6 °F (37 °C) (Temporal)   Ht 5' 10" (1 778 m)   BMI 28 47 kg/m²           Physical Exam   Constitutional: He appears well-developed and well-nourished  No distress  Wearing [place] c-collar  He habitually tucks chin under chin rest of c-collar  Eyes: Conjunctivae are normal    Pulmonary/Chest: Effort normal    Musculoskeletal:        Cervical back: He exhibits no tenderness  He has good cervical ROM with cervical flexion and extension of neck and denies neck pain doing such motion  Neurological: He is alert  Reflex Scores:       Tricep reflexes are Right triceps reflex grade: +1-2  and Left triceps reflex grade: +1-2  Mliadys Reyes Bicep reflexes are 1+ on the right side and 1+ on the left side  Brachioradialis reflexes are 1+ on the right side and 1+ on the left side  Patellar reflexes are 1+ on the right side and 1+ on the left side  Achilles reflexes are 1+ on the right side and 1+ on the left side  Skin: Skin is warm and dry  No breakdown of skin underlying c-collar  Psychiatric: He has a normal mood and affect  Neurologic Exam     Mental Status   Oriented to person  (Knows he is at doctors office but said in [facility] )  Disoriented to year  (When asked what month it is he replies "Is it January? ")  Level of consciousness: alert    Motor Exam     Motor:  Shoulder abduction 5/5 bilaterally  (Sorness of shoulders)  Elbow flexion/extension 5/5 bilaterally  Wrist flexion/extension 5/5 bilaterally    Finger  / abduction 5/5 bilaterally  Hip flexion/abduction/adduction 5/5 bilaterally  Knee flexion/extension 5/5 bilaterally  Ankle DF/PF 5/5 bilaterally  Great toe DF 5/5 bilaterally  Sensory Exam     Sensation to pinprick is more sensitive right foot then left foot  Sensation to pinprick otherwise intact b/l upper extremities, torso, and b/l lower extremities  JPS and Vibratory sense intact b/l thumbs and great toes  Gait, Coordination, and Reflexes     Reflexes   Right brachioradialis: 1+  Left brachioradialis: 1+  Right biceps: 1+  Left biceps: 1+  Right triceps reflex grade: +1-2  Left triceps reflex grade: +1-2  Right patellar: 1+  Left patellar: 1+  Right achilles: 1+  Left achilles: 1+  Right plantar: normal  Left plantar: normal  Right ankle clonus: absent  Left ankle clonus: absent    Patient laying on transport stretcher         Imaging study:    1/2/19 [facility]) C-spine xray -- per report: " CERVICAL SPINE     INDICATION:   S12 101S: Unspecified nondisplaced fracture of second cervical vertebra, sequela      COMPARISON:  Plain radiographs December 5, 2018 as well as CT cervical spine November 13, 2018      VIEWS:  XR SPINE CERVICAL COMPLETE 4 OR 5 VW NON INJURY   Images: 6     FINDINGS:  Osseous structures are demineralized  No acute fracture or destructive osseous lesion  The previously identified questionable nondisplaced fracture involving the right lateral mass of C2, is not seen on plain radiographs      No evidence of dynamic instability seen on flexion or extension      Disc space narrowing diffusely throughout the cervical spine with associated uncinate process hypertrophy and facet hypertrophic changes  Bridging anterior osteophytic spur formation      The prevertebral soft tissues are within normal limits      IMPRESSION:  1  Degenerative changes of the cervical spine  No evidence of cervical instability    2   Nonvisualization of the questionable fracture involving the right lateral mass of C2        If there is continued concern for fracture involving the right lateral mass of C2, consider follow-up MRI of the cervical spine with coronal STIR sequencing      The study was marked in EPIC for immediate notification      Workstation performed: [id number] "

## 2019-01-02 NOTE — PATIENT INSTRUCTIONS
Provided with new [place] c-collar pads and placed the new c-collar pad on chin rest of [place] c-collar  May discontinue c-collar  May increase activity as tolerated from neurosurgical standpoint but recommend you take fall precautions and refrain from activity that increases chance of fall or injury to neck  Follow up with your Primary Care Provider for evaluation / management of bony demineralization (ie  Possible osteoporosis)  Contact our office or present to hospital Emergency Room if experience worsening or new neck pain, sensory or motor change, bladder or bowel dysfunction, or other neurological change

## 2019-02-26 ENCOUNTER — HOSPITAL ENCOUNTER (EMERGENCY)
Facility: HOSPITAL | Age: 84
Discharge: NURSING, REHAB OR LONG-TERM CARE | End: 2019-02-26
Attending: EMERGENCY MEDICINE | Admitting: EMERGENCY MEDICINE
Payer: MEDICARE

## 2019-02-26 VITALS
OXYGEN SATURATION: 96 % | HEART RATE: 93 BPM | WEIGHT: 200 LBS | BODY MASS INDEX: 28.7 KG/M2 | RESPIRATION RATE: 18 BRPM | TEMPERATURE: 97.2 F | SYSTOLIC BLOOD PRESSURE: 179 MMHG | DIASTOLIC BLOOD PRESSURE: 88 MMHG

## 2019-02-26 DIAGNOSIS — R45.1 AGITATION: ICD-10-CM

## 2019-02-26 DIAGNOSIS — F03.90 DEMENTIA (HCC): Primary | ICD-10-CM

## 2019-02-26 DIAGNOSIS — N39.0 UTI (URINARY TRACT INFECTION): ICD-10-CM

## 2019-02-26 LAB
ALBUMIN SERPL BCP-MCNC: 3.9 G/DL (ref 3.5–5)
ALP SERPL-CCNC: 107 U/L (ref 46–116)
ALT SERPL W P-5'-P-CCNC: 24 U/L (ref 12–78)
AMMONIA PLAS-SCNC: 31 UMOL/L (ref 11–35)
AMPHETAMINES SERPL QL SCN: NEGATIVE
ANION GAP SERPL CALCULATED.3IONS-SCNC: 8 MMOL/L (ref 4–13)
APAP SERPL-MCNC: <2 UG/ML (ref 10–30)
AST SERPL W P-5'-P-CCNC: 14 U/L (ref 5–45)
ATRIAL RATE: 87 BPM
BACTERIA UR QL AUTO: ABNORMAL /HPF
BARBITURATES UR QL: NEGATIVE
BASOPHILS # BLD AUTO: 0.04 THOUSANDS/ΜL (ref 0–0.1)
BASOPHILS NFR BLD AUTO: 1 % (ref 0–1)
BENZODIAZ UR QL: NEGATIVE
BILIRUB SERPL-MCNC: 0.32 MG/DL (ref 0.2–1)
BILIRUB UR QL STRIP: NEGATIVE
BUN SERPL-MCNC: 16 MG/DL (ref 5–25)
CALCIUM SERPL-MCNC: 8.8 MG/DL (ref 8.3–10.1)
CHLORIDE SERPL-SCNC: 104 MMOL/L (ref 100–108)
CLARITY UR: ABNORMAL
CO2 SERPL-SCNC: 28 MMOL/L (ref 21–32)
COCAINE UR QL: NEGATIVE
COLOR UR: YELLOW
COLOR, POC: NORMAL
CREAT SERPL-MCNC: 1.03 MG/DL (ref 0.6–1.3)
EOSINOPHIL # BLD AUTO: 0.21 THOUSAND/ΜL (ref 0–0.61)
EOSINOPHIL NFR BLD AUTO: 4 % (ref 0–6)
ERYTHROCYTE [DISTWIDTH] IN BLOOD BY AUTOMATED COUNT: 13.7 % (ref 11.6–15.1)
ETHANOL SERPL-MCNC: <3 MG/DL (ref 0–3)
GFR SERPL CREATININE-BSD FRML MDRD: 65 ML/MIN/1.73SQ M
GLUCOSE SERPL-MCNC: 147 MG/DL (ref 65–140)
GLUCOSE SERPL-MCNC: 184 MG/DL (ref 65–140)
GLUCOSE UR STRIP-MCNC: NEGATIVE MG/DL
HCT VFR BLD AUTO: 40 % (ref 36.5–49.3)
HGB BLD-MCNC: 13.1 G/DL (ref 12–17)
HGB UR QL STRIP.AUTO: ABNORMAL
IMM GRANULOCYTES # BLD AUTO: 0.02 THOUSAND/UL (ref 0–0.2)
IMM GRANULOCYTES NFR BLD AUTO: 0 % (ref 0–2)
KETONES UR STRIP-MCNC: NEGATIVE MG/DL
LEUKOCYTE ESTERASE UR QL STRIP: ABNORMAL
LYMPHOCYTES # BLD AUTO: 1.49 THOUSANDS/ΜL (ref 0.6–4.47)
LYMPHOCYTES NFR BLD AUTO: 26 % (ref 14–44)
MCH RBC QN AUTO: 28.8 PG (ref 26.8–34.3)
MCHC RBC AUTO-ENTMCNC: 32.8 G/DL (ref 31.4–37.4)
MCV RBC AUTO: 88 FL (ref 82–98)
METHADONE UR QL: NEGATIVE
MONOCYTES # BLD AUTO: 0.43 THOUSAND/ΜL (ref 0.17–1.22)
MONOCYTES NFR BLD AUTO: 8 % (ref 4–12)
NEUTROPHILS # BLD AUTO: 3.48 THOUSANDS/ΜL (ref 1.85–7.62)
NEUTS SEG NFR BLD AUTO: 61 % (ref 43–75)
NITRITE UR QL STRIP: NEGATIVE
NON-SQ EPI CELLS URNS QL MICRO: ABNORMAL /HPF
NRBC BLD AUTO-RTO: 0 /100 WBCS
OPIATES UR QL SCN: NEGATIVE
P AXIS: 75 DEGREES
PCP UR QL: NEGATIVE
PH UR STRIP.AUTO: 7 [PH] (ref 4.5–8)
PLATELET # BLD AUTO: 190 THOUSANDS/UL (ref 149–390)
PMV BLD AUTO: 9.9 FL (ref 8.9–12.7)
POTASSIUM SERPL-SCNC: 4.2 MMOL/L (ref 3.5–5.3)
PR INTERVAL: 200 MS
PROT SERPL-MCNC: 7.2 G/DL (ref 6.4–8.2)
PROT UR STRIP-MCNC: ABNORMAL MG/DL
QRS AXIS: 12 DEGREES
QRSD INTERVAL: 100 MS
QT INTERVAL: 348 MS
QTC INTERVAL: 361 MS
RBC # BLD AUTO: 4.55 MILLION/UL (ref 3.88–5.62)
RBC #/AREA URNS AUTO: ABNORMAL /HPF
SALICYLATES SERPL-MCNC: <3 MG/DL (ref 3–20)
SODIUM SERPL-SCNC: 140 MMOL/L (ref 136–145)
SP GR UR STRIP.AUTO: 1.02 (ref 1–1.03)
T WAVE AXIS: 53 DEGREES
T4 FREE SERPL-MCNC: 0.79 NG/DL (ref 0.76–1.46)
THC UR QL: NEGATIVE
TRI-PHOS CRY URNS QL MICRO: ABNORMAL /HPF
TROPONIN I SERPL-MCNC: <0.02 NG/ML
TSH SERPL DL<=0.05 MIU/L-ACNC: 6.11 UIU/ML (ref 0.36–3.74)
UROBILINOGEN UR QL STRIP.AUTO: 0.2 E.U./DL
VENTRICULAR RATE: 65 BPM
WBC # BLD AUTO: 5.67 THOUSAND/UL (ref 4.31–10.16)
WBC #/AREA URNS AUTO: ABNORMAL /HPF

## 2019-02-26 PROCEDURE — 87186 SC STD MICRODIL/AGAR DIL: CPT | Performed by: EMERGENCY MEDICINE

## 2019-02-26 PROCEDURE — 80329 ANALGESICS NON-OPIOID 1 OR 2: CPT | Performed by: EMERGENCY MEDICINE

## 2019-02-26 PROCEDURE — 82140 ASSAY OF AMMONIA: CPT | Performed by: EMERGENCY MEDICINE

## 2019-02-26 PROCEDURE — 87086 URINE CULTURE/COLONY COUNT: CPT | Performed by: EMERGENCY MEDICINE

## 2019-02-26 PROCEDURE — 36415 COLL VENOUS BLD VENIPUNCTURE: CPT | Performed by: EMERGENCY MEDICINE

## 2019-02-26 PROCEDURE — 99285 EMERGENCY DEPT VISIT HI MDM: CPT

## 2019-02-26 PROCEDURE — 84443 ASSAY THYROID STIM HORMONE: CPT | Performed by: EMERGENCY MEDICINE

## 2019-02-26 PROCEDURE — 80053 COMPREHEN METABOLIC PANEL: CPT | Performed by: EMERGENCY MEDICINE

## 2019-02-26 PROCEDURE — 93005 ELECTROCARDIOGRAM TRACING: CPT

## 2019-02-26 PROCEDURE — 80320 DRUG SCREEN QUANTALCOHOLS: CPT | Performed by: EMERGENCY MEDICINE

## 2019-02-26 PROCEDURE — 84484 ASSAY OF TROPONIN QUANT: CPT | Performed by: EMERGENCY MEDICINE

## 2019-02-26 PROCEDURE — 87077 CULTURE AEROBIC IDENTIFY: CPT | Performed by: EMERGENCY MEDICINE

## 2019-02-26 PROCEDURE — 80307 DRUG TEST PRSMV CHEM ANLYZR: CPT | Performed by: EMERGENCY MEDICINE

## 2019-02-26 PROCEDURE — 84439 ASSAY OF FREE THYROXINE: CPT | Performed by: EMERGENCY MEDICINE

## 2019-02-26 PROCEDURE — 82948 REAGENT STRIP/BLOOD GLUCOSE: CPT

## 2019-02-26 PROCEDURE — 93010 ELECTROCARDIOGRAM REPORT: CPT | Performed by: INTERNAL MEDICINE

## 2019-02-26 PROCEDURE — 85025 COMPLETE CBC W/AUTO DIFF WBC: CPT | Performed by: EMERGENCY MEDICINE

## 2019-02-26 PROCEDURE — 81001 URINALYSIS AUTO W/SCOPE: CPT | Performed by: EMERGENCY MEDICINE

## 2019-02-26 RX ORDER — CEPHALEXIN 250 MG/1
500 CAPSULE ORAL 4 TIMES DAILY
Qty: 80 CAPSULE | Refills: 0 | Status: SHIPPED | OUTPATIENT
Start: 2019-02-26 | End: 2019-03-08

## 2019-02-26 RX ORDER — OLANZAPINE 5 MG/1
5 TABLET, ORALLY DISINTEGRATING ORAL ONCE
Status: COMPLETED | OUTPATIENT
Start: 2019-02-26 | End: 2019-02-26

## 2019-02-26 RX ORDER — LORAZEPAM 2 MG/ML
0.5 CONCENTRATE ORAL EVERY 8 HOURS PRN
COMMUNITY

## 2019-02-26 RX ORDER — QUETIAPINE FUMARATE 50 MG/1
50 TABLET, FILM COATED ORAL
COMMUNITY

## 2019-02-26 RX ORDER — CEPHALEXIN 250 MG/1
500 CAPSULE ORAL ONCE
Status: COMPLETED | OUTPATIENT
Start: 2019-02-26 | End: 2019-02-26

## 2019-02-26 RX ADMIN — CEPHALEXIN 500 MG: 250 CAPSULE ORAL at 11:57

## 2019-02-26 RX ADMIN — OLANZAPINE 5 MG: 5 TABLET, ORALLY DISINTEGRATING ORAL at 12:44

## 2019-02-26 RX ADMIN — OLANZAPINE 5 MG: 5 TABLET, ORALLY DISINTEGRATING ORAL at 09:48

## 2019-02-26 NOTE — ED NOTES
Pt attempting to get out of bed, pt unsteady on his feet  Unable to redirect pt  Pt continually pulling at wires  Pty cooperative at this time, reoriented    Pt moved to hallway for closer RN observation and fall precaution     Rhoda Singh RN  69/53/47 1372

## 2019-02-26 NOTE — ED NOTES
Mau Mcguire, crisis, at bedside     [facility] [address] [place] [address]  91/16/78 0401

## 2019-02-26 NOTE — ED NOTES
Dr Mare Gimenez at the bedside for patient evaluation at this time     Delmi Ruano, RN  02/26/19 3409

## 2019-02-26 NOTE — ED NOTES
Pt cleaned of urine and small amount of stool, new linens and baby powder placed on fold of groin(d/t moisture/ redness)  [place] released, new gown applied and skin checked for breakdown  No breakdown or redness noted  Posey reapplied  Pt given water and offered food    Kiron and TV on for comfort     Vielka Ta RN  10/76/84 5123

## 2019-02-26 NOTE — SOCIAL WORK
CM consulted to assist in setting up transportation for Pt  CM contacted [place] who is able to transport between [phone]  CM contacted [id number] to inform them of same  Medical Necessity was completed and placed on chart  Medical team aware of transport time

## 2019-02-26 NOTE — ED NOTES
Pt incontinent of urine and stool  Pt cleaned and linens changed    Brief applied for transport     Joe Almanza, TYLER  29/15/79 9831

## 2019-02-26 NOTE — ED NOTES
Spoke with [facility] staff and updated pt will be returning [phone]    Advised of discharge instructions and medications     Amira Tellez RN  97/05/01 9045

## 2019-02-26 NOTE — ED NOTES
[facility] unavailable to transport pt to [address] [place] [address] until 7pm via BLS    [facility] advised [place] advised no availability     Brunilda Bishop RN  79/40/27 7150
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
## 2019-02-26 NOTE — ED NOTES
Patient removed his own IV at this time  Patient cleaned, pressure dressing applied  No active bleeding  Patient confused per baseline        Jennifer Resendiz RN  02/26/19 8688
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
## 2019-02-26 NOTE — ED NOTES
Pt continually attempting to get out of bed  Unable to redirect pt and unable to reorient pt    Posey applied to patient at this time     Shahla Dey RN  14/80/47 3371

## 2019-02-26 NOTE — ED NOTES
Pt cleaned of incontinuent urine, new pad pad placed under pt  Posey released, skin checked and reapplied  No skin breakdown or redness noted    B [facility] cancelled and [facility] advised p/u between [id number]     Vicente Borges RN  62/50/93 7706

## 2019-02-28 LAB — BACTERIA UR CULT: ABNORMAL

## 2022-11-17 ENCOUNTER — VBI (OUTPATIENT)
Dept: ADMINISTRATIVE | Facility: OTHER | Age: 87
End: 2022-11-17